# Patient Record
Sex: MALE | Race: WHITE | NOT HISPANIC OR LATINO | Employment: OTHER | URBAN - METROPOLITAN AREA
[De-identification: names, ages, dates, MRNs, and addresses within clinical notes are randomized per-mention and may not be internally consistent; named-entity substitution may affect disease eponyms.]

---

## 2023-02-08 PROBLEM — I10 BENIGN HYPERTENSION: Status: ACTIVE | Noted: 2023-02-08

## 2023-02-08 PROBLEM — E66.3 OVERWEIGHT WITH BODY MASS INDEX (BMI) OF 28 TO 28.9 IN ADULT: Status: ACTIVE | Noted: 2023-02-08

## 2023-02-08 PROBLEM — D75.1 POLYCYTHEMIA: Status: ACTIVE | Noted: 2023-02-08

## 2023-02-08 PROBLEM — F17.200 TOBACCO USE DISORDER: Status: ACTIVE | Noted: 2023-02-08

## 2023-02-08 PROBLEM — R39.9 UTI SYMPTOMS: Status: ACTIVE | Noted: 2023-02-08

## 2023-02-08 PROBLEM — E78.5 HYPERLIPIDEMIA: Status: ACTIVE | Noted: 2023-02-08

## 2023-02-08 PROBLEM — R35.0 URINE FREQUENCY: Status: ACTIVE | Noted: 2023-02-08

## 2023-02-08 PROBLEM — K63.5 POLYP OF COLON: Status: ACTIVE | Noted: 2023-02-08

## 2023-02-08 PROBLEM — F41.9 ANXIETY DISORDER: Status: ACTIVE | Noted: 2023-02-08

## 2023-02-08 PROBLEM — M70.60 TROCHANTERIC BURSITIS: Status: ACTIVE | Noted: 2023-02-08

## 2023-02-08 PROBLEM — G47.33 OSA (OBSTRUCTIVE SLEEP APNEA): Status: ACTIVE | Noted: 2023-02-08

## 2023-02-08 PROBLEM — N40.0 BPH (BENIGN PROSTATIC HYPERPLASIA): Status: ACTIVE | Noted: 2023-02-08

## 2023-02-08 PROBLEM — N53.14 RETROGRADE EJACULATION: Status: ACTIVE | Noted: 2023-02-08

## 2023-02-08 PROBLEM — M54.32 LEFT SIDED SCIATICA: Status: ACTIVE | Noted: 2023-02-08

## 2023-02-08 PROBLEM — K42.9 UMBILICAL HERNIA: Status: ACTIVE | Noted: 2023-02-08

## 2023-02-08 PROBLEM — H40.9 GLAUCOMA: Status: ACTIVE | Noted: 2023-02-08

## 2023-02-08 RX ORDER — VALSARTAN 80 MG/1
1 TABLET ORAL DAILY
COMMUNITY
End: 2023-03-21 | Stop reason: SDUPTHER

## 2023-02-08 RX ORDER — BRIMONIDINE TARTRATE 2 MG/ML
1 SOLUTION/ DROPS OPHTHALMIC EVERY 12 HOURS
COMMUNITY
Start: 2012-10-17

## 2023-02-08 RX ORDER — ATORVASTATIN CALCIUM 40 MG/1
40 TABLET, FILM COATED ORAL NIGHTLY
COMMUNITY
Start: 2018-08-22 | End: 2024-02-02

## 2023-02-08 RX ORDER — TAMSULOSIN HYDROCHLORIDE 0.4 MG/1
CAPSULE ORAL
COMMUNITY
Start: 2014-02-03 | End: 2023-04-17

## 2023-02-08 RX ORDER — FAMOTIDINE 40 MG/1
1 TABLET, FILM COATED ORAL 2 TIMES DAILY
COMMUNITY
Start: 2013-01-31 | End: 2024-02-02

## 2023-02-08 RX ORDER — TIMOLOL MALEATE 5 MG/ML
1 SOLUTION/ DROPS OPHTHALMIC EVERY 12 HOURS
COMMUNITY
Start: 2012-06-13 | End: 2023-11-21 | Stop reason: ALTCHOICE

## 2023-02-08 RX ORDER — CYCLOSPORINE 0.5 MG/ML
1 EMULSION OPHTHALMIC EVERY 12 HOURS
COMMUNITY
Start: 2013-04-04 | End: 2023-11-21 | Stop reason: ALTCHOICE

## 2023-02-08 RX ORDER — SERTRALINE HYDROCHLORIDE 100 MG/1
TABLET, FILM COATED ORAL
COMMUNITY
Start: 2013-01-31 | End: 2023-07-07

## 2023-03-15 ASSESSMENT — ENCOUNTER SYMPTOMS
SHORTNESS OF BREATH: 0
SWEATS: 0
BLURRED VISION: 0
PALPITATIONS: 0
PND: 0
NECK PAIN: 0
HYPERTENSION: 1
HEADACHES: 0
ORTHOPNEA: 0

## 2023-03-21 ENCOUNTER — OFFICE VISIT (OUTPATIENT)
Dept: PRIMARY CARE | Facility: CLINIC | Age: 68
End: 2023-03-21
Payer: COMMERCIAL

## 2023-03-21 VITALS
DIASTOLIC BLOOD PRESSURE: 88 MMHG | BODY MASS INDEX: 29.41 KG/M2 | WEIGHT: 185 LBS | TEMPERATURE: 97.1 F | SYSTOLIC BLOOD PRESSURE: 152 MMHG

## 2023-03-21 DIAGNOSIS — F17.200 TOBACCO USE DISORDER: ICD-10-CM

## 2023-03-21 DIAGNOSIS — N40.1 BENIGN PROSTATIC HYPERPLASIA WITH URINARY FREQUENCY: ICD-10-CM

## 2023-03-21 DIAGNOSIS — F41.9 ANXIETY DISORDER, UNSPECIFIED TYPE: ICD-10-CM

## 2023-03-21 DIAGNOSIS — I10 BENIGN HYPERTENSION: Primary | ICD-10-CM

## 2023-03-21 DIAGNOSIS — E78.5 HYPERLIPIDEMIA, UNSPECIFIED HYPERLIPIDEMIA TYPE: ICD-10-CM

## 2023-03-21 DIAGNOSIS — R35.0 BENIGN PROSTATIC HYPERPLASIA WITH URINARY FREQUENCY: ICD-10-CM

## 2023-03-21 DIAGNOSIS — G47.33 OSA (OBSTRUCTIVE SLEEP APNEA): ICD-10-CM

## 2023-03-21 PROBLEM — M70.60 TROCHANTERIC BURSITIS: Status: RESOLVED | Noted: 2023-02-08 | Resolved: 2023-03-21

## 2023-03-21 PROBLEM — R39.9 UTI SYMPTOMS: Status: RESOLVED | Noted: 2023-02-08 | Resolved: 2023-03-21

## 2023-03-21 PROCEDURE — 1160F RVW MEDS BY RX/DR IN RCRD: CPT | Performed by: INTERNAL MEDICINE

## 2023-03-21 PROCEDURE — 1159F MED LIST DOCD IN RCRD: CPT | Performed by: INTERNAL MEDICINE

## 2023-03-21 PROCEDURE — 99214 OFFICE O/P EST MOD 30 MIN: CPT | Performed by: INTERNAL MEDICINE

## 2023-03-21 PROCEDURE — 3077F SYST BP >= 140 MM HG: CPT | Performed by: INTERNAL MEDICINE

## 2023-03-21 PROCEDURE — 3079F DIAST BP 80-89 MM HG: CPT | Performed by: INTERNAL MEDICINE

## 2023-03-21 RX ORDER — VALSARTAN 160 MG/1
160 TABLET ORAL DAILY
Qty: 90 TABLET | Refills: 3 | Status: SHIPPED | OUTPATIENT
Start: 2023-03-21 | End: 2024-02-26

## 2023-03-21 ASSESSMENT — PATIENT HEALTH QUESTIONNAIRE - PHQ9
2. FEELING DOWN, DEPRESSED OR HOPELESS: NOT AT ALL
1. LITTLE INTEREST OR PLEASURE IN DOING THINGS: NOT AT ALL
SUM OF ALL RESPONSES TO PHQ9 QUESTIONS 1 AND 2: 0

## 2023-03-21 ASSESSMENT — ENCOUNTER SYMPTOMS
BLURRED VISION: 0
SHORTNESS OF BREATH: 0
NECK PAIN: 0
PALPITATIONS: 0
HYPERTENSION: 1
PND: 0
SWEATS: 0
HEADACHES: 0
ORTHOPNEA: 0

## 2023-03-21 NOTE — PROGRESS NOTES
"Subjective   Patient ID: Shawn Alfonso is a 67 y.o. male who presents for Follow-up (3 month).    Hypertension  This is a recurrent problem. The current episode started more than 1 month ago. The problem has been gradually improving since onset. The problem is controlled. Pertinent negatives include no anxiety, blurred vision, chest pain, headaches, malaise/fatigue, neck pain, orthopnea, palpitations, peripheral edema, PND, shortness of breath or sweats. There are no associated agents to hypertension. There are no known risk factors for coronary artery disease. There are no compliance problems.       #1 BPH- appt pending w/ .   Stable symptoms  #2 hyperlipidemia- on rx, no diff  #3 anxiety disorder - \"good\"   #4 nicotine dependence- rare tob use  #5 colon polyps- no change BMs  #6 htn- too high, increase valsartan to 160mg. f/u 3 mths  #7 polycythemia    Review of Systems   Constitutional:  Negative for malaise/fatigue.   Eyes:  Negative for blurred vision.   Respiratory:  Negative for shortness of breath.    Cardiovascular:  Negative for chest pain, palpitations, orthopnea and PND.   Musculoskeletal:  Negative for neck pain.   Neurological:  Negative for headaches.   All other systems reviewed and are negative.      Objective   /88   Temp 36.2 °C (97.1 °F)   Wt 83.9 kg (185 lb)   BMI 29.41 kg/m²     Physical Exam  Constitutional:       General: He is not in acute distress.     Appearance: Normal appearance. He is not ill-appearing or toxic-appearing.   Cardiovascular:      Rate and Rhythm: Normal rate and regular rhythm.      Heart sounds: No murmur heard.  Pulmonary:      Effort: Pulmonary effort is normal.      Breath sounds: Normal breath sounds.   Abdominal:      General: Abdomen is flat.      Palpations: Abdomen is soft. There is no mass.      Tenderness: There is no abdominal tenderness.   Neurological:      Mental Status: He is alert and oriented to person, place, and time.   Psychiatric:       "   Mood and Affect: Mood normal.         Thought Content: Thought content normal.         Judgment: Judgment normal.         Assessment/Plan       #1 BPH- remains an issue. f/u . Follow. PSA   #2 hyperlipidemia- check labs. Continue treatment.  #3 anxiety disorder -Stable. Continue treatment   #4 nicotine dependence- reviewed importance of smoking cessation again. Reviewed need to discontinue, reviewed strategies. Recommend CT chest, he declines.. . His smoking history is relatively mild, approximately 10 pack yearsâ€“smokes cigars. Spent 5 minutes discussing importance of discontinuing.  #5 colon polyps- follow up scope long overdue! reviewed w/ pt at length again. Again, stressed importance.  reviewed risk of delayed dx of colon CA.   #6 htn- too high, increase valsartan to 160mg. f/u 3 mths  #7 polycythemia- resolved. retest    #8 snoring, witnessed apneas, increased blood pressure. Sleep study ordered--> he declines.. Reviewed importance with patient.  Order entered for screening lab works.     Patient will consider tetanus booster, declines now. Declines shingles vaccine.  prevnar 20/pvax 23--> rec. reviewed.

## 2023-03-22 ENCOUNTER — LAB (OUTPATIENT)
Dept: LAB | Facility: LAB | Age: 68
End: 2023-03-22
Payer: COMMERCIAL

## 2023-03-22 DIAGNOSIS — N40.1 BENIGN PROSTATIC HYPERPLASIA WITH URINARY FREQUENCY: ICD-10-CM

## 2023-03-22 DIAGNOSIS — R35.0 BENIGN PROSTATIC HYPERPLASIA WITH URINARY FREQUENCY: ICD-10-CM

## 2023-03-22 DIAGNOSIS — E78.5 HYPERLIPIDEMIA, UNSPECIFIED HYPERLIPIDEMIA TYPE: ICD-10-CM

## 2023-03-22 DIAGNOSIS — I10 BENIGN HYPERTENSION: ICD-10-CM

## 2023-03-22 LAB
ALANINE AMINOTRANSFERASE (SGPT) (U/L) IN SER/PLAS: 22 U/L (ref 10–52)
ANION GAP IN SER/PLAS: 13 MMOL/L (ref 10–20)
CALCIUM (MG/DL) IN SER/PLAS: 9.4 MG/DL (ref 8.6–10.3)
CARBON DIOXIDE, TOTAL (MMOL/L) IN SER/PLAS: 28 MMOL/L (ref 21–32)
CHLORIDE (MMOL/L) IN SER/PLAS: 98 MMOL/L (ref 98–107)
CHOLESTEROL (MG/DL) IN SER/PLAS: 173 MG/DL (ref 0–199)
CHOLESTEROL IN HDL (MG/DL) IN SER/PLAS: 70.7 MG/DL
CHOLESTEROL/HDL RATIO: 2.4
CREATININE (MG/DL) IN SER/PLAS: 0.96 MG/DL (ref 0.5–1.3)
ERYTHROCYTE DISTRIBUTION WIDTH (RATIO) BY AUTOMATED COUNT: 13 % (ref 11.5–14.5)
ERYTHROCYTE MEAN CORPUSCULAR HEMOGLOBIN CONCENTRATION (G/DL) BY AUTOMATED: 33.1 G/DL (ref 32–36)
ERYTHROCYTE MEAN CORPUSCULAR VOLUME (FL) BY AUTOMATED COUNT: 90 FL (ref 80–100)
ERYTHROCYTES (10*6/UL) IN BLOOD BY AUTOMATED COUNT: 5.46 X10E12/L (ref 4.5–5.9)
GFR MALE: 87 ML/MIN/1.73M2
GLUCOSE (MG/DL) IN SER/PLAS: 98 MG/DL (ref 74–99)
HEMATOCRIT (%) IN BLOOD BY AUTOMATED COUNT: 49.3 % (ref 41–52)
HEMOGLOBIN (G/DL) IN BLOOD: 16.3 G/DL (ref 13.5–17.5)
LDL: 83 MG/DL (ref 0–99)
LEUKOCYTES (10*3/UL) IN BLOOD BY AUTOMATED COUNT: 5 X10E9/L (ref 4.4–11.3)
PLATELETS (10*3/UL) IN BLOOD AUTOMATED COUNT: 211 X10E9/L (ref 150–450)
POTASSIUM (MMOL/L) IN SER/PLAS: 4.6 MMOL/L (ref 3.5–5.3)
PROSTATE SPECIFIC AG (NG/ML) IN SER/PLAS: 2 NG/ML (ref 0–4)
SODIUM (MMOL/L) IN SER/PLAS: 134 MMOL/L (ref 136–145)
TRIGLYCERIDE (MG/DL) IN SER/PLAS: 95 MG/DL (ref 0–149)
UREA NITROGEN (MG/DL) IN SER/PLAS: 22 MG/DL (ref 6–23)
VLDL: 19 MG/DL (ref 0–40)

## 2023-03-22 PROCEDURE — 84153 ASSAY OF PSA TOTAL: CPT

## 2023-03-22 PROCEDURE — 36415 COLL VENOUS BLD VENIPUNCTURE: CPT

## 2023-03-22 PROCEDURE — 80061 LIPID PANEL: CPT

## 2023-03-22 PROCEDURE — 85027 COMPLETE CBC AUTOMATED: CPT

## 2023-03-22 PROCEDURE — 84460 ALANINE AMINO (ALT) (SGPT): CPT

## 2023-03-22 PROCEDURE — 80048 BASIC METABOLIC PNL TOTAL CA: CPT

## 2023-04-17 DIAGNOSIS — N40.1 BENIGN PROSTATIC HYPERPLASIA WITH LOWER URINARY TRACT SYMPTOMS, SYMPTOM DETAILS UNSPECIFIED: Primary | ICD-10-CM

## 2023-04-17 RX ORDER — TAMSULOSIN HYDROCHLORIDE 0.4 MG/1
CAPSULE ORAL
Qty: 90 CAPSULE | Refills: 3 | Status: SHIPPED | OUTPATIENT
Start: 2023-04-17 | End: 2023-11-21 | Stop reason: ALTCHOICE

## 2023-06-07 ASSESSMENT — ENCOUNTER SYMPTOMS
HEADACHES: 0
SWEATS: 0
ORTHOPNEA: 0
NECK PAIN: 0
PND: 0
PALPITATIONS: 0
BLURRED VISION: 0
HYPERTENSION: 1
SHORTNESS OF BREATH: 0

## 2023-06-12 ENCOUNTER — OFFICE VISIT (OUTPATIENT)
Dept: PRIMARY CARE | Facility: CLINIC | Age: 68
End: 2023-06-12
Payer: COMMERCIAL

## 2023-06-12 VITALS
TEMPERATURE: 97.3 F | DIASTOLIC BLOOD PRESSURE: 82 MMHG | SYSTOLIC BLOOD PRESSURE: 120 MMHG | WEIGHT: 186 LBS | HEIGHT: 68 IN | BODY MASS INDEX: 28.19 KG/M2

## 2023-06-12 DIAGNOSIS — E78.5 HYPERLIPIDEMIA, UNSPECIFIED HYPERLIPIDEMIA TYPE: Primary | ICD-10-CM

## 2023-06-12 DIAGNOSIS — N40.1 BENIGN PROSTATIC HYPERPLASIA WITH URINARY FREQUENCY: ICD-10-CM

## 2023-06-12 DIAGNOSIS — I10 BENIGN HYPERTENSION: ICD-10-CM

## 2023-06-12 DIAGNOSIS — R35.0 BENIGN PROSTATIC HYPERPLASIA WITH URINARY FREQUENCY: ICD-10-CM

## 2023-06-12 PROCEDURE — 1160F RVW MEDS BY RX/DR IN RCRD: CPT | Performed by: INTERNAL MEDICINE

## 2023-06-12 PROCEDURE — 3074F SYST BP LT 130 MM HG: CPT | Performed by: INTERNAL MEDICINE

## 2023-06-12 PROCEDURE — 99213 OFFICE O/P EST LOW 20 MIN: CPT | Performed by: INTERNAL MEDICINE

## 2023-06-12 PROCEDURE — 1159F MED LIST DOCD IN RCRD: CPT | Performed by: INTERNAL MEDICINE

## 2023-06-12 PROCEDURE — 3079F DIAST BP 80-89 MM HG: CPT | Performed by: INTERNAL MEDICINE

## 2023-06-12 ASSESSMENT — ENCOUNTER SYMPTOMS
HEADACHES: 0
PND: 0
ORTHOPNEA: 0
BLURRED VISION: 0
SWEATS: 0
SHORTNESS OF BREATH: 0
HYPERTENSION: 1
PALPITATIONS: 0
NECK PAIN: 0

## 2023-06-12 NOTE — PROGRESS NOTES
"Subjective   Patient ID: Shawn Alfonso is a 68 y.o. male who presents for Follow-up.    Hypertension  This is a recurrent problem. The current episode started more than 1 month ago. The problem has been gradually improving since onset. The problem is controlled. Pertinent negatives include no anxiety, blurred vision, chest pain, headaches, malaise/fatigue, neck pain, orthopnea, palpitations, peripheral edema, PND, shortness of breath or sweats. There are no associated agents to hypertension. There are no known risk factors for coronary artery disease. There are no compliance problems.       #1 BPH- appt pending w/ .   Stable symptoms but remains an issue, nocturia/freq  #2 hyperlipidemia- on rx, no diff  #3 anxiety disorder - \"good\"   #4 nicotine dependence- rare tob use  #5 colon polyps- no change BMs  #6 htn- at home 120s.  No HA, CP, dizziness.   #7 polycythemia    Review of Systems   Constitutional:  Negative for malaise/fatigue.   Eyes:  Negative for blurred vision.   Respiratory:  Negative for shortness of breath.    Cardiovascular:  Negative for chest pain, palpitations, orthopnea and PND.   Musculoskeletal:  Negative for neck pain.   Neurological:  Negative for headaches.   All other systems reviewed and are negative.      Objective   /82   Temp 36.3 °C (97.3 °F)   Ht 1.715 m (5' 7.5\")   Wt 84.4 kg (186 lb)   BMI 28.70 kg/m²     Physical Exam  Constitutional:       General: He is not in acute distress.     Appearance: Normal appearance. He is not ill-appearing or toxic-appearing.   Cardiovascular:      Rate and Rhythm: Normal rate and regular rhythm.      Heart sounds: No murmur heard.  Pulmonary:      Effort: Pulmonary effort is normal.      Breath sounds: Normal breath sounds.   Abdominal:      General: Abdomen is flat.      Palpations: Abdomen is soft. There is no mass.      Tenderness: There is no abdominal tenderness.   Neurological:      Mental Status: He is alert and oriented to person, " place, and time.   Psychiatric:         Mood and Affect: Mood normal.         Thought Content: Thought content normal.         Judgment: Judgment normal.       Lab Results   Component Value Date    WBC 5.0 03/22/2023    HGB 16.3 03/22/2023    HCT 49.3 03/22/2023     03/22/2023    CHOL 173 03/22/2023    TRIG 95 03/22/2023    HDL 70.7 03/22/2023    ALT 22 03/22/2023    AST 20 10/22/2018     (L) 03/22/2023    K 4.6 03/22/2023    CL 98 03/22/2023    CREATININE 0.96 03/22/2023    BUN 22 03/22/2023    CO2 28 03/22/2023    PSA 2.00 03/22/2023       Assessment/Plan       #1 BPH- remains an issue. f/u . Follow. PSA 9 mths  #2 hyperlipidemia- check labs. Continue treatment.  #3 anxiety disorder -Stable. Continue treatment   #4 nicotine dependence- reviewed importance of smoking cessation again. Reviewed need to discontinue, reviewed strategies. Recommend CT chest, he declines.  His smoking history is relatively mild, approximately 10 pack years, smokes cigars. Spent 5 minutes discussing importance of discontinuing.  #5 colon polyps- follow up scope long overdue! reviewed w/ pt at length again. Again, stressed importance.  reviewed risk of delayed dx of colon CA. He has information to sched  #6 htn- good, con't rx.  #7 polycythemia- resolved. retest    #8 snoring, witnessed apneas, increased blood pressure. Sleep study ordered--> he declines.. Reviewed importance with patient.  Order entered for screening lab works.     Patient will consider tetanus booster, declines now.   Declines shingles vaccine.  Reviewed.  prevnar 20--> rec. reviewed.

## 2023-07-07 DIAGNOSIS — F41.9 ANXIETY DISORDER, UNSPECIFIED TYPE: Primary | ICD-10-CM

## 2023-07-07 RX ORDER — SERTRALINE HYDROCHLORIDE 100 MG/1
TABLET, FILM COATED ORAL
Qty: 180 TABLET | Refills: 3 | Status: SHIPPED | OUTPATIENT
Start: 2023-07-07

## 2023-10-03 ENCOUNTER — APPOINTMENT (OUTPATIENT)
Dept: UROLOGY | Facility: CLINIC | Age: 68
End: 2023-10-03
Payer: COMMERCIAL

## 2023-10-16 ENCOUNTER — PROCEDURE VISIT (OUTPATIENT)
Dept: UROLOGY | Facility: CLINIC | Age: 68
End: 2023-10-16
Payer: COMMERCIAL

## 2023-10-16 VITALS
DIASTOLIC BLOOD PRESSURE: 92 MMHG | HEIGHT: 67 IN | SYSTOLIC BLOOD PRESSURE: 138 MMHG | TEMPERATURE: 97.3 F | BODY MASS INDEX: 29.03 KG/M2 | WEIGHT: 185 LBS

## 2023-10-16 DIAGNOSIS — N13.8 BPH WITH OBSTRUCTION/LOWER URINARY TRACT SYMPTOMS: Primary | ICD-10-CM

## 2023-10-16 DIAGNOSIS — N40.1 BPH WITH OBSTRUCTION/LOWER URINARY TRACT SYMPTOMS: Primary | ICD-10-CM

## 2023-10-16 DIAGNOSIS — Z01.818 PREOP TESTING: ICD-10-CM

## 2023-10-16 LAB
POC APPEARANCE, URINE: CLEAR
POC BILIRUBIN, URINE: NEGATIVE
POC BLOOD, URINE: NEGATIVE
POC COLOR, URINE: YELLOW
POC GLUCOSE, URINE: NEGATIVE MG/DL
POC KETONES, URINE: NEGATIVE MG/DL
POC LEUKOCYTES, URINE: NEGATIVE
POC NITRITE,URINE: NEGATIVE
POC PH, URINE: 5.5 PH
POC PROTEIN, URINE: NEGATIVE MG/DL
POC SPECIFIC GRAVITY, URINE: 1.01
POC UROBILINOGEN, URINE: 0.2 EU/DL

## 2023-10-16 PROCEDURE — 99215 OFFICE O/P EST HI 40 MIN: CPT | Performed by: UROLOGY

## 2023-10-16 PROCEDURE — 81002 URINALYSIS NONAUTO W/O SCOPE: CPT | Performed by: UROLOGY

## 2023-10-16 PROCEDURE — 52000 CYSTOURETHROSCOPY: CPT | Performed by: UROLOGY

## 2023-10-16 RX ORDER — SODIUM CHLORIDE, SODIUM LACTATE, POTASSIUM CHLORIDE, CALCIUM CHLORIDE 600; 310; 30; 20 MG/100ML; MG/100ML; MG/100ML; MG/100ML
100 INJECTION, SOLUTION INTRAVENOUS CONTINUOUS
Status: CANCELLED | OUTPATIENT
Start: 2023-10-16

## 2023-10-16 NOTE — PROGRESS NOTES
"Subjective     Shawn Alfonso is a 68 y.o. male presenting for cystoscopy in anticipation of an outlet procedure. Patient initially had increased urinary frequency. Patient reported he has had worsening LUTs for the last 4-6 months, he is mostly bothered by urinary frequency. He also has spontaneously leakage which is bothersome. He is currently taking 04.mg of Tamsulosin with no improvement in symptoms. Patient denies gross hematuria, dysuria, and flank pain. He has family history of prostate cancer (father).      PROCEDURE NOTE:    PREOPERATIVE DIAGNOSIS:  BPH     POSTOPERATIVE DIAGNOSIS:  Same    OPERATION:  Flexible Cystourethroscopy      ANESTHESIA:  2%  lidocaine jelly    COMPLICATIONS:  None    EBL: Minimal    DISPOSITION:  The patient was discharged home after the procedure, per routine.    INDICATIONS: :  Mr. Alfonso is a 68 y.o. patient with a history of BPH with LUTs who presents today for Cystoscopy.     The indications, risks and benefits of this procedure were discussed with the patient, consent was obtained prior to the procedure, and to the best of my judgement the patient seemed to understand and agree to the procedure.    PROCEDURE:  The patient  was brought into the procedure suite and informed consent was reviewed and confirmed. Vital signs were obtained prior to the procedure: BP (!) 138/92 (BP Location: Right arm, Patient Position: Sitting)   Temp 36.3 °C (97.3 °F) (Temporal)   Ht 1.702 m (5' 7\")   Wt 83.9 kg (185 lb)   BMI 28.98 kg/m² .  The patient was escorted onto the stretcher, placed supine, prepped with betadine and draped in the usual standard surgical fashion.  Intraurethral 2% viscous lidocaine jelly was used for local analgesia.  A 16 [place] flexible cystourethroscope was inserted into the urethra.       The penile urethra was normal.  Prostate: 73g on MRI   Bladder: Upon entering the bladder the entire bladder was surveyed in a 360 degree fashion.  The left and right ureteral " orifices were in normal orthotopic position effluxing clear yellow urine, bilaterally.   There was no evidence of any bladder lesions, foreign objects, stones or evidence of any mucosal changes. The cystoscope was then retroflexed.  The bladder neck was then further examined without any evidence of lesions.     The scope was then removed and in an antegrade fashion, the urethra and bladder were again resurveyed with no evidence of additional lesions.  The cystoscope was then fully removed.   The patient tolerated the procedure well.  Vitals were stable after the procedure.  The patient was able to void and was discharged home.  Verbal and written Post procedure instructions were reviewed with the patient.    IMPRESSION:  Trabeculated distended bladder, intravesical non obstructing median lobe, lateral hypertrophy, good sphincter coaptation, complete channel occlusion.     PLAN: Will plan for HoLEP    No past medical history on file.  Past Surgical History:   Procedure Laterality Date    CATARACT EXTRACTION  05/28/2013    Cataract Surgery    KNEE SURGERY  05/28/2013    Knee Surgery    OTHER SURGICAL HISTORY  05/28/2013    Eye Surgery Results Retina Reattached    UMBILICAL HERNIA REPAIR  02/20/2014    Umbilical Hernia Repair     Family History   Problem Relation Name Age of Onset    Other (Heart valve replacement) Mother      Prostate cancer Father       Current Outpatient Medications   Medication Sig Dispense Refill    atorvastatin (Lipitor) 40 mg tablet TAKE 1 TABLET DAILY AS DIRECTED (NEED FOLLOW UP APPOINTMENT)      brimonidine (AlphaGAN P) 0.2 % ophthalmic solution Administer 1 drop into both eyes every 12 hours.      cycloSPORINE (Restasis) 0.05 % ophthalmic emulsion Administer 1 drop into both eyes every 12 hours.      famotidine (Pepcid) 40 mg tablet 1 tablet (40 mg) 2 times a day.      sertraline (Zoloft) 100 mg tablet TAKE 2 TABLETS DAILY (WILL NEED AN APPOINTMENT FOR MORE REFILLS) 180 tablet 3    tamsulosin  (Flomax) 0.4 mg 24 hr capsule TAKE 1 CAPSULE DAILY (NEED APPOINTMENT FOR FURTHER REFILLS) 90 capsule 3    timolol (Timoptic) 0.5 % ophthalmic solution Administer 1 drop into both eyes every 12 hours.      valsartan (Diovan) 160 mg tablet Take 1 tablet (160 mg) by mouth once daily. For blood pressure 90 tablet 3     No current facility-administered medications for this visit.     No Known Allergies  Social History     Socioeconomic History    Marital status:      Spouse name: Not on file    Number of children: Not on file    Years of education: Not on file    Highest education level: Not on file   Occupational History    Not on file   Tobacco Use    Smoking status: Every Day     Types: Cigars    Smokeless tobacco: Never   Substance and Sexual Activity    Alcohol use: Not on file    Drug use: Not on file    Sexual activity: Not on file   Other Topics Concern    Not on file   Social History Narrative    Not on file     Social Determinants of Health     Financial Resource Strain: Not on file   Food Insecurity: Not on file   Transportation Needs: Not on file   Physical Activity: Not on file   Stress: Not on file   Social Connections: Not on file   Intimate Partner Violence: Not on file   Housing Stability: Not on file           Lab Review  Lab Results   Component Value Date    WBC 5.0 03/22/2023    RBC 5.46 03/22/2023    HGB 16.3 03/22/2023    HCT 49.3 03/22/2023     03/22/2023      Lab Results   Component Value Date    BUN 22 03/22/2023    CREATININE 0.96 03/22/2023      Lab Results   Component Value Date    PSA 2.00 03/22/2023         Assessment/Plan   Diagnoses and all orders for this visit:  BPH with obstruction/lower urinary tract symptoms  -     Cystourethroscopy; Future  -     POCT UA (nonautomated) manually resulted    BPH with LUTs     Cystoscopy was completed today with no complications. Prostate size from MRI is 73g.     Given the large volume of the prostate, I recommended proceeding with HoLEP.  I discussed that a laser will be used to shell out the obstructing tissue from the inside of the prostate gland. I discussed in detail the risks associated with the HoLEP procedure. As with any surgical procedure, HoLEP surgery has some risks. Such as, incontinence of urine which is common for a few months after surgery but is rarely permanent (about one percent of cases), bleeding after surgery, the need for transfusion or another operation due to bleeding, UTI, damage to the bladder, damage to the ureteral orifice (a small tube where kidney drains into the bladder), prolonged need for a catheter after surgery, or scar tissue in the area of surgery or urethra. Retrograde ejaculation was discussed as a permanent irreversible side effect.      All questions were answered to the patient's satisfaction. Patient agrees with the plan and wishes to proceed. Follow-up will be scheduled appropriately.       I spent 40 minutes of dedicated E&M time, including preparation and review of records, notes, and data, time spent with patient/family, and documentation.     Scribed for Dr. Martinez by Maria Teresa Bennett. I , Dr Martinez, have personally reviewed and agreed with the information entered by the Virtual Scribe.

## 2023-11-21 ENCOUNTER — PRE-ADMISSION TESTING (OUTPATIENT)
Dept: PREADMISSION TESTING | Facility: HOSPITAL | Age: 68
End: 2023-11-21
Payer: COMMERCIAL

## 2023-11-21 ENCOUNTER — LAB (OUTPATIENT)
Dept: LAB | Facility: LAB | Age: 68
End: 2023-11-21
Payer: COMMERCIAL

## 2023-11-21 VITALS
HEIGHT: 67 IN | SYSTOLIC BLOOD PRESSURE: 134 MMHG | TEMPERATURE: 96.3 F | HEART RATE: 64 BPM | BODY MASS INDEX: 28.51 KG/M2 | WEIGHT: 181.66 LBS | RESPIRATION RATE: 16 BRPM | DIASTOLIC BLOOD PRESSURE: 83 MMHG | OXYGEN SATURATION: 97 %

## 2023-11-21 DIAGNOSIS — Z01.818 PREOP TESTING: ICD-10-CM

## 2023-11-21 DIAGNOSIS — N40.1 BPH WITH OBSTRUCTION/LOWER URINARY TRACT SYMPTOMS: ICD-10-CM

## 2023-11-21 DIAGNOSIS — N13.8 BPH WITH OBSTRUCTION/LOWER URINARY TRACT SYMPTOMS: ICD-10-CM

## 2023-11-21 DIAGNOSIS — Z01.818 PREOPERATIVE TESTING: Primary | ICD-10-CM

## 2023-11-21 LAB
ANION GAP SERPL CALC-SCNC: 13 MMOL/L (ref 10–20)
BUN SERPL-MCNC: 16 MG/DL (ref 6–23)
CALCIUM SERPL-MCNC: 9.2 MG/DL (ref 8.6–10.6)
CHLORIDE SERPL-SCNC: 105 MMOL/L (ref 98–107)
CO2 SERPL-SCNC: 25 MMOL/L (ref 21–32)
CREAT SERPL-MCNC: 1.04 MG/DL (ref 0.5–1.3)
ERYTHROCYTE [DISTWIDTH] IN BLOOD BY AUTOMATED COUNT: 11.8 % (ref 11.5–14.5)
GFR SERPL CREATININE-BSD FRML MDRD: 79 ML/MIN/1.73M*2
GLUCOSE SERPL-MCNC: 88 MG/DL (ref 74–99)
HCT VFR BLD AUTO: 46.6 % (ref 41–52)
HGB BLD-MCNC: 15.7 G/DL (ref 13.5–17.5)
INR PPP: 0.9 (ref 0.9–1.2)
MCH RBC QN AUTO: 31.1 PG (ref 26–34)
MCHC RBC AUTO-ENTMCNC: 33.7 G/DL (ref 32–36)
MCV RBC AUTO: 92 FL (ref 80–100)
NRBC BLD-RTO: NORMAL /100{WBCS}
PLATELET # BLD AUTO: 195 X10*3/UL (ref 150–450)
POTASSIUM SERPL-SCNC: 4.5 MMOL/L (ref 3.5–5.3)
PROTHROMBIN TIME: 9 SECONDS (ref 9.3–12.7)
RBC # BLD AUTO: 5.05 X10*6/UL (ref 4.5–5.9)
SODIUM SERPL-SCNC: 138 MMOL/L (ref 136–145)
WBC # BLD AUTO: 5.2 X10*3/UL (ref 4.4–11.3)

## 2023-11-21 PROCEDURE — 99203 OFFICE O/P NEW LOW 30 MIN: CPT

## 2023-11-21 PROCEDURE — 85610 PROTHROMBIN TIME: CPT

## 2023-11-21 PROCEDURE — 36415 COLL VENOUS BLD VENIPUNCTURE: CPT

## 2023-11-21 PROCEDURE — 87086 URINE CULTURE/COLONY COUNT: CPT

## 2023-11-21 PROCEDURE — 85027 COMPLETE CBC AUTOMATED: CPT

## 2023-11-21 PROCEDURE — 93005 ELECTROCARDIOGRAM TRACING: CPT

## 2023-11-21 PROCEDURE — 80048 BASIC METABOLIC PNL TOTAL CA: CPT

## 2023-11-21 RX ORDER — DORZOLAMIDE HYDROCHLORIDE AND TIMOLOL MALEATE 20; 5 MG/ML; MG/ML
1 SOLUTION/ DROPS OPHTHALMIC 2 TIMES DAILY
COMMUNITY

## 2023-11-21 RX ORDER — LATANOPROST 50 UG/ML
1 SOLUTION/ DROPS OPHTHALMIC NIGHTLY
COMMUNITY

## 2023-11-21 RX ORDER — MINERAL OIL
180 ENEMA (ML) RECTAL DAILY
COMMUNITY

## 2023-11-21 RX ORDER — ACETAMINOPHEN 500 MG
1000 TABLET ORAL EVERY 6 HOURS PRN
COMMUNITY

## 2023-11-21 ASSESSMENT — DUKE ACTIVITY SCORE INDEX (DASI)
CAN YOU PARTICIPATE IN MODERATE RECREATIONAL ACTIVITIES LIKE GOLF, BOWLING, DANCING, DOUBLES TENNIS OR THROWING A BASEBALL OR FOOTBALL: YES
CAN YOU CLIMB A FLIGHT OF STAIRS OR WALK UP A HILL: YES
CAN YOU DO MODERATE WORK AROUND THE HOUSE LIKE VACUUMING, SWEEPING FLOORS OR CARRYING GROCERIES: YES
DASI METS SCORE: 7
CAN YOU HAVE SEXUAL RELATIONS: YES
CAN YOU TAKE CARE OF YOURSELF (EAT, DRESS, BATHE, OR USE TOILET): YES
CAN YOU WALK A BLOCK OR TWO ON LEVEL GROUND: YES
TOTAL_SCORE: 34.7
CAN YOU DO HEAVY WORK AROUND THE HOUSE LIKE SCRUBBING FLOORS OR LIFTING AND MOVING HEAVY FURNITURE: NO
CAN YOU PARTICIPATE IN STRENOUS SPORTS LIKE SWIMMING, SINGLES TENNIS, FOOTBALL, BASKETBALL, OR SKIING: NO
CAN YOU RUN A SHORT DISTANCE: NO
CAN YOU DO YARD WORK LIKE RAKING LEAVES, WEEDING OR PUSHING A MOWER: YES
CAN YOU DO LIGHT WORK AROUND THE HOUSE LIKE DUSTING OR WASHING DISHES: YES
CAN YOU WALK INDOORS, SUCH AS AROUND YOUR HOUSE: YES

## 2023-11-21 ASSESSMENT — CHADS2 SCORE
CHADS2 SCORE: 1
HYPERTENSION: YES
CHF: NO
PRIOR STROKE OR TIA OR THROMBOEMBOLISM: NO
AGE GREATER THAN OR EQUAL TO 75: NO
DIABETES: NO

## 2023-11-21 ASSESSMENT — PAIN SCALES - GENERAL: PAINLEVEL_OUTOF10: 0 - NO PAIN

## 2023-11-21 ASSESSMENT — PAIN - FUNCTIONAL ASSESSMENT: PAIN_FUNCTIONAL_ASSESSMENT: 0-10

## 2023-11-21 NOTE — PREPROCEDURE INSTRUCTIONS
Medication List            Accurate as of November 21, 2023 10:10 AM. Always use your most recent med list.                acetaminophen 500 mg tablet  Commonly known as: Tylenol  Medication Adjustments for Surgery: Take morning of surgery with sip of water, no other fluids     atorvastatin 40 mg tablet  Commonly known as: Lipitor  Medication Adjustments for Surgery: Continue until night before surgery     brimonidine 0.2 % ophthalmic solution  Commonly known as: AlphaGAN  Medication Adjustments for Surgery: Take morning of surgery with sip of water, no other fluids     dorzolamide-timoloL 22.3-6.8 mg/mL ophthalmic solution  Commonly known as: Cosopt  Medication Adjustments for Surgery: Take morning of surgery with sip of water, no other fluids     famotidine 40 mg tablet  Commonly known as: Pepcid  Medication Adjustments for Surgery: Take morning of surgery with sip of water, no other fluids     fexofenadine 180 mg tablet  Commonly known as: Allegra  Medication Adjustments for Surgery: Take morning of surgery with sip of water, no other fluids     latanoprost 0.005 % ophthalmic solution  Commonly known as: Xalatan  Medication Adjustments for Surgery: Take morning of surgery with sip of water, no other fluids     sertraline 100 mg tablet  Commonly known as: Zoloft  TAKE 2 TABLETS DAILY (WILL NEED AN APPOINTMENT FOR MORE REFILLS)  Medication Adjustments for Surgery: Take morning of surgery with sip of water, no other fluids     valsartan 160 mg tablet  Commonly known as: Diovan  Take 1 tablet (160 mg) by mouth once daily. For blood pressure  Medication Adjustments for Surgery: Stop 1 day before surgery                              NPO Instructions:    Do not eat any food after midnight the night before your surgery/procedure.    Additional Instructions:     Seven/Six Days before Surgery:  Review your medication instructions, stop indicated medications  Five Days before Surgery:  Review your medication  instructions, stop indicated medications  Three Days before Surgery:  Review your medication instructions, stop indicated medications  The Day before Surgery:  Review your medication instructions, stop indicated medications  You will be contacted regarding the time of your arrival to facility and surgery time  Do not eat any food after Midnight  Day of Surgery:  Review your medication instructions, take indicated medications  Wear  comfortable loose fitting clothing  Do not use moisturizers, creams, lotions or perfume  All jewelry and valuables should be left at home

## 2023-11-21 NOTE — CPM/PAT H&P
CPM/PAT Evaluation       Name: Shawn Alfonso (Shawn Alfonso)  /Age: 1955/68 y.o.     In Person visit      Chief Complaint: BPH with LUTS    HPI  Patient is a 69 y/o alert and oriented male coming in for PAT for a holmium laser enucleation of the prostate scheduled on 2023 with Dr Martinez. He reports urinary frequency, urgency, and nocturia without dysuria, hematuria, fevers, chills, or myalgias. Patient denies Cx pain, SOB, STODDARD, and NVDC. Patient also denies Hx DVT/PE. Current medications were reviewed and a presurgical medication schedule was provided. He has no questions at this time.    NO PERSONAL REPORTS OF REACTIONS TO ANESTHESIA  NO PERSONAL REPORTS OF FAMILY HISTORY OF REACTIONS TO ANESTHESIA  NO PERSONAL REPORTS OF METAL, NICKEL, OR SHELLFISH ALLERGY  CURRENT DAILT CIGAR SMOKER-1 CIGAR A DAY X 10 YEARS  +ETOH 2 DRINKS A WEEK/NO DRUGS    Past Medical History:   Diagnosis Date    Anxiety     BPH (benign prostatic hyperplasia)     Depression     GERD (gastroesophageal reflux disease)     Glaucoma     Hyperlipidemia     Hypertension      Past Surgical History:   Procedure Laterality Date    CATARACT EXTRACTION Bilateral 2013    Cataract Surgery    KNEE SURGERY Bilateral 2013    B/L knee scopes    OTHER SURGICAL HISTORY Left 2013    Eye Surgery Results Retina Reattached    UMBILICAL HERNIA REPAIR  2014    Umbilical Hernia Repair     Family History   Problem Relation Name Age of Onset    Other (Heart valve replacement) Mother      Cancer Father      Prostate cancer Father       No Known Allergies    Medication Documentation Review Audit       Reviewed by Susana Davidson RN (Registered Nurse) on 23 at 1004      Medication Order Taking? Sig Documenting Provider Last Dose Status   acetaminophen (Tylenol) 500 mg tablet [id number] No Take 2 tablets (1,000 mg) by mouth every 6 hours if needed for mild pain (1 - 3). Historical Provider, MD More than a month Active    atorvastatin (Lipitor) 40 mg tablet [id number] Yes Take 1 tablet (40 mg) by mouth once daily at bedtime. Doug Provider, MD 11/20/2023 Active   brimonidine (AlphaGAN P) 0.2 % ophthalmic solution [id number] Yes Administer 1 drop into both eyes every 12 hours. Doug Valdez MD 11/21/2023 Active     Discontinued 11/21/23 0959   dorzolamide-timoloL (Cosopt) 22.3-6.8 mg/mL ophthalmic solution [id number] Yes Administer 1 drop into both eyes 2 times a day. Doug Valdez MD 11/21/2023 tylenol Active   famotidine (Pepcid) 40 mg tablet [id number] Yes 1 tablet (40 mg) 2 times a day. Doug Valdez MD 11/21/2023 Active   fexofenadine (Allegra) 180 mg tablet [id number] Yes Take 1 tablet (180 mg) by mouth once daily. Doug Valdez MD 11/21/2023 Active   latanoprost (Xalatan) 0.005 % ophthalmic solution [id number] Yes Administer 1 drop into both eyes once daily at bedtime. Doug Provider, MD 11/20/2023 Active   sertraline (Zoloft) 100 mg tablet [id number] Yes TAKE 2 TABLETS DAILY (WILL NEED AN APPOINTMENT FOR MORE REFILLS)   Patient taking differently: Take 2 tablets (200 mg) by mouth once daily at bedtime.    Brittany Jacob MD 11/20/2023 Active     Discontinued 11/21/23 0959     Discontinued 11/21/23 1000   valsartan (Diovan) 160 mg tablet [id number] Yes Take 1 tablet (160 mg) by mouth once daily. For blood pressure Brittany Jacob MD 11/21/2023 Active                  Review of Systems   Constitutional: Negative for chills, decreased appetite, diaphoresis, fever and malaise/fatigue.   Eyes:  Negative for blurred vision and double vision.   Cardiovascular:  Negative for chest pain, claudication, cyanosis, dyspnea on exertion, irregular heartbeat, leg swelling, near-syncope and palpitations.   Respiratory:  Negative for cough, hemoptysis, shortness of breath and wheezing.    Endocrine: Negative for cold intolerance, heat intolerance, polydipsia, polyphagia and polyuria.   Gastrointestinal:  Negative for  "abdominal pain, constipation, diarrhea, dysphagia, nausea and vomiting.   Genitourinary:  Positive for frequency, urgency, and nocturia. Negative for bladder incontinence, dysuria, hematuria, incomplete emptying, pelvic pain.   Neurological:  Negative for headaches, light-headedness, paresthesias, sensory change and weakness.   Psychiatric/Behavioral:  Negative for altered mental status.       Vitals and nursing note reviewed. Physical exam within normal limits.   Constitutional:       Appearance: Normal appearance. He is normal weight.   HENT:      Head: Normocephalic and atraumatic.      Mouth/Throat:      Mouth: Mucous membranes are moist.      Pharynx: Oropharynx is clear.   Eyes:      Extraocular Movements: Extraocular movements intact.      Conjunctiva/sclera: Conjunctivae normal.      Pupils: Pupils are equal, round, and reactive to light.   Cardiovascular:      Rate and Rhythm: Normal rate and regular rhythm.      Pulses: Normal pulses.      Heart sounds: Normal heart sounds.   Pulmonary:      Effort: Pulmonary effort is normal.      Breath sounds: Normal breath sounds.   Abdominal:      General: Abdomen is flat. Bowel sounds are normal.      Palpations: Abdomen is soft.   Musculoskeletal:      Cervical back: Normal range of motion and neck supple.   Skin:     General: Skin is warm and dry.      Capillary Refill: Capillary refill takes less than 2 seconds.   Neurological:      General: No focal deficit present.      Mental Status: He is alert and oriented to person, place, and time. Mental status is at baseline.   Psychiatric:         Mood and Affect: Mood normal.         Behavior: Behavior normal.         Thought Content: Thought content normal.         Judgment: Judgment normal.     PAT AIRWAY:   Airway:     Mallampati::  III    TM distance::  >3 FB    Neck ROM::  Full     Visit Vitals  /83   Pulse 64   Temp 35.7 °C (96.3 °F) (Temporal)   Resp 16   Ht 1.702 m (5' 7\")   Wt 82.4 kg (181 lb 10.5 oz) "   SpO2 97%   BMI 28.45 kg/m²   Smoking Status Every Day   BSA 1.97 m²      EKG COMPLETED IN [facility] 11/21/2023. NSR. RATE 62. NO ACUTE CHANGES.   ASYMPTOMATIC WITH GOOD FUNCTIONAL STATUS    DASI Risk Score      Flowsheet Row Most Recent Value   DASI SCORE 34.7   METS Score (Will be calculated only when all the questions are answered) 7          Caprini DVT Assessment      Flowsheet Row Most Recent Value   DVT Score 5   Current Status Major surgery planned, including arthroscopic and laproscopic (1-2 hours)   Age 60-75 years   BMI 30 or less          Modified Frailty Index      Flowsheet Row Most Recent Value   Modified Frailty Index Calculator .0909          CHADS2 Stroke Risk  Current as of 12 minutes ago        N/A 3 - 100%: High Risk   2 - 3%: Medium Risk   0 - 2%: Low Risk     Last Change: N/A          This score determines the patient's risk of having a stroke if the patient has atrial fibrillation.        This score is not applicable to this patient. Components are not calculated.          Revised Cardiac Risk Index      Flowsheet Row Most Recent Value   Revised Cardiac Risk Calculator 0          Apfel Simplified Score    No data to display       Risk Analysis Index Results This Encounter    No data found in the last 1 encounters.       Stop Bang Score      Flowsheet Row Most Recent Value   Do you snore loudly? 1   Do you often feel tired or fatigued after your sleep? 0   Has anyone ever observed you stop breathing in your sleep? 0   Do you have or are you being treated for high blood pressure? 1   Recent BMI (Calculated) 28.5   Is BMI greater than 35 kg/m2? 0=No   Age older than 50 years old? 1=Yes   Is your neck circumference greater than 17 inches (Male) or 16 inches (Female)? 0   Gender - Male 1=Yes   STOP-BANG Total Score 4          Assessment and Plan:     BPH with LUTS-holmium laser enucleation of the prostate scheduled on 11/30/2023 with Dr Martinez.    Hypertension-Managed with valsartan (Diovan) 160 mg  tablet. BP in /83    GERD-Managed with famotidine (Pepcid) 40 mg tablet    Preoperative risk assessment  ASA II  RCRI-0 POINTS CLASS I RISK 3.9%  STOP-BANGS-4 POINTS MODERATE RISK FOR SABINE  NSQIP-PREDICTED LENGTH OF STAY 0.5 DAYS  ARISCAT-3 POINTS LOW RISK 1.6%  DASI-34.7 POINTS. 7 METS  TEJA-0.1%  JHFRAT-6 POINTS MODERATE RISK FOR FALLS  CLEARANCE-NA  PAT TESTING-CBC, BMP, PT/INR, UCX ORDERED BY DR UBRT. EKG COMPLETED IN PAT    *CLEARED FOR SURGERY PENDING LABS/EKG     *FACE TO FACE TIME 20 MINUTES.

## 2023-11-21 NOTE — H&P (VIEW-ONLY)
CPM/PAT Evaluation       Name: Shawn Alfonso (Shawn Alfonso)  /Age: 1955/68 y.o.     In Person visit      Chief Complaint: BPH with LUTS    HPI  Patient is a 67 y/o alert and oriented male coming in for PAT for a holmium laser enucleation of the prostate scheduled on 2023 with Dr Martinez. He reports urinary frequency, urgency, and nocturia without dysuria, hematuria, fevers, chills, or myalgias. Patient denies Cx pain, SOB, STODDARD, and NVDC. Patient also denies Hx DVT/PE. Current medications were reviewed and a presurgical medication schedule was provided. He has no questions at this time.    NO PERSONAL REPORTS OF REACTIONS TO ANESTHESIA  NO PERSONAL REPORTS OF FAMILY HISTORY OF REACTIONS TO ANESTHESIA  NO PERSONAL REPORTS OF METAL, NICKEL, OR SHELLFISH ALLERGY  CURRENT DAILT CIGAR SMOKER-1 CIGAR A DAY X 10 YEARS  +ETOH 2 DRINKS A WEEK/NO DRUGS    Past Medical History:   Diagnosis Date    Anxiety     BPH (benign prostatic hyperplasia)     Depression     GERD (gastroesophageal reflux disease)     Glaucoma     Hyperlipidemia     Hypertension      Past Surgical History:   Procedure Laterality Date    CATARACT EXTRACTION Bilateral 2013    Cataract Surgery    KNEE SURGERY Bilateral 2013    B/L knee scopes    OTHER SURGICAL HISTORY Left 2013    Eye Surgery Results Retina Reattached    UMBILICAL HERNIA REPAIR  2014    Umbilical Hernia Repair     Family History   Problem Relation Name Age of Onset    Other (Heart valve replacement) Mother      Cancer Father      Prostate cancer Father       No Known Allergies    Medication Documentation Review Audit       Reviewed by Susana Davidson RN (Registered Nurse) on 23 at 1004      Medication Order Taking? Sig Documenting Provider Last Dose Status   acetaminophen (Tylenol) 500 mg tablet [id number] No Take 2 tablets (1,000 mg) by mouth every 6 hours if needed for mild pain (1 - 3). Historical Provider, MD More than a month Active    atorvastatin (Lipitor) 40 mg tablet [id number] Yes Take 1 tablet (40 mg) by mouth once daily at bedtime. Doug Provider, MD 11/20/2023 Active   brimonidine (AlphaGAN P) 0.2 % ophthalmic solution [id number] Yes Administer 1 drop into both eyes every 12 hours. Doug Valdez MD 11/21/2023 Active     Discontinued 11/21/23 0959   dorzolamide-timoloL (Cosopt) 22.3-6.8 mg/mL ophthalmic solution [id number] Yes Administer 1 drop into both eyes 2 times a day. Doug Valdez MD 11/21/2023 tylenol Active   famotidine (Pepcid) 40 mg tablet [id number] Yes 1 tablet (40 mg) 2 times a day. Doug Valdez MD 11/21/2023 Active   fexofenadine (Allegra) 180 mg tablet [id number] Yes Take 1 tablet (180 mg) by mouth once daily. Doug Valdez MD 11/21/2023 Active   latanoprost (Xalatan) 0.005 % ophthalmic solution [id number] Yes Administer 1 drop into both eyes once daily at bedtime. Doug Provider, MD 11/20/2023 Active   sertraline (Zoloft) 100 mg tablet [id number] Yes TAKE 2 TABLETS DAILY (WILL NEED AN APPOINTMENT FOR MORE REFILLS)   Patient taking differently: Take 2 tablets (200 mg) by mouth once daily at bedtime.    Brittany Jacob MD 11/20/2023 Active     Discontinued 11/21/23 0959     Discontinued 11/21/23 1000   valsartan (Diovan) 160 mg tablet [id number] Yes Take 1 tablet (160 mg) by mouth once daily. For blood pressure Brittany Jcaob MD 11/21/2023 Active                  Review of Systems   Constitutional: Negative for chills, decreased appetite, diaphoresis, fever and malaise/fatigue.   Eyes:  Negative for blurred vision and double vision.   Cardiovascular:  Negative for chest pain, claudication, cyanosis, dyspnea on exertion, irregular heartbeat, leg swelling, near-syncope and palpitations.   Respiratory:  Negative for cough, hemoptysis, shortness of breath and wheezing.    Endocrine: Negative for cold intolerance, heat intolerance, polydipsia, polyphagia and polyuria.   Gastrointestinal:  Negative for  "abdominal pain, constipation, diarrhea, dysphagia, nausea and vomiting.   Genitourinary:  Positive for frequency, urgency, and nocturia. Negative for bladder incontinence, dysuria, hematuria, incomplete emptying, pelvic pain.   Neurological:  Negative for headaches, light-headedness, paresthesias, sensory change and weakness.   Psychiatric/Behavioral:  Negative for altered mental status.       Vitals and nursing note reviewed. Physical exam within normal limits.   Constitutional:       Appearance: Normal appearance. He is normal weight.   HENT:      Head: Normocephalic and atraumatic.      Mouth/Throat:      Mouth: Mucous membranes are moist.      Pharynx: Oropharynx is clear.   Eyes:      Extraocular Movements: Extraocular movements intact.      Conjunctiva/sclera: Conjunctivae normal.      Pupils: Pupils are equal, round, and reactive to light.   Cardiovascular:      Rate and Rhythm: Normal rate and regular rhythm.      Pulses: Normal pulses.      Heart sounds: Normal heart sounds.   Pulmonary:      Effort: Pulmonary effort is normal.      Breath sounds: Normal breath sounds.   Abdominal:      General: Abdomen is flat. Bowel sounds are normal.      Palpations: Abdomen is soft.   Musculoskeletal:      Cervical back: Normal range of motion and neck supple.   Skin:     General: Skin is warm and dry.      Capillary Refill: Capillary refill takes less than 2 seconds.   Neurological:      General: No focal deficit present.      Mental Status: He is alert and oriented to person, place, and time. Mental status is at baseline.   Psychiatric:         Mood and Affect: Mood normal.         Behavior: Behavior normal.         Thought Content: Thought content normal.         Judgment: Judgment normal.     PAT AIRWAY:   Airway:     Mallampati::  III    TM distance::  >3 FB    Neck ROM::  Full     Visit Vitals  /83   Pulse 64   Temp 35.7 °C (96.3 °F) (Temporal)   Resp 16   Ht 1.702 m (5' 7\")   Wt 82.4 kg (181 lb 10.5 oz) "   SpO2 97%   BMI 28.45 kg/m²   Smoking Status Every Day   BSA 1.97 m²      EKG COMPLETED IN [facility] 11/21/2023. NSR. RATE 62. NO ACUTE CHANGES.   ASYMPTOMATIC WITH GOOD FUNCTIONAL STATUS    DASI Risk Score      Flowsheet Row Most Recent Value   DASI SCORE 34.7   METS Score (Will be calculated only when all the questions are answered) 7          Caprini DVT Assessment      Flowsheet Row Most Recent Value   DVT Score 5   Current Status Major surgery planned, including arthroscopic and laproscopic (1-2 hours)   Age 60-75 years   BMI 30 or less          Modified Frailty Index      Flowsheet Row Most Recent Value   Modified Frailty Index Calculator .0909          CHADS2 Stroke Risk  Current as of 12 minutes ago        N/A 3 - 100%: High Risk   2 - 3%: Medium Risk   0 - 2%: Low Risk     Last Change: N/A          This score determines the patient's risk of having a stroke if the patient has atrial fibrillation.        This score is not applicable to this patient. Components are not calculated.          Revised Cardiac Risk Index      Flowsheet Row Most Recent Value   Revised Cardiac Risk Calculator 0          Apfel Simplified Score    No data to display       Risk Analysis Index Results This Encounter    No data found in the last 1 encounters.       Stop Bang Score      Flowsheet Row Most Recent Value   Do you snore loudly? 1   Do you often feel tired or fatigued after your sleep? 0   Has anyone ever observed you stop breathing in your sleep? 0   Do you have or are you being treated for high blood pressure? 1   Recent BMI (Calculated) 28.5   Is BMI greater than 35 kg/m2? 0=No   Age older than 50 years old? 1=Yes   Is your neck circumference greater than 17 inches (Male) or 16 inches (Female)? 0   Gender - Male 1=Yes   STOP-BANG Total Score 4          Assessment and Plan:     BPH with LUTS-holmium laser enucleation of the prostate scheduled on 11/30/2023 with Dr Martinez.    Hypertension-Managed with valsartan (Diovan) 160 mg  tablet. BP in /83    GERD-Managed with famotidine (Pepcid) 40 mg tablet    Preoperative risk assessment  ASA II  RCRI-0 POINTS CLASS I RISK 3.9%  STOP-BANGS-4 POINTS MODERATE RISK FOR SABINE  NSQIP-PREDICTED LENGTH OF STAY 0.5 DAYS  ARISCAT-3 POINTS LOW RISK 1.6%  DASI-34.7 POINTS. 7 METS  TEJA-0.1%  JHFRAT-6 POINTS MODERATE RISK FOR FALLS  CLEARANCE-NA  PAT TESTING-CBC, BMP, PT/INR, UCX ORDERED BY DR BURT. EKG COMPLETED IN PAT    *CLEARED FOR SURGERY PENDING LABS/EKG     *FACE TO FACE TIME 20 MINUTES.

## 2023-11-22 LAB — BACTERIA UR CULT: NO GROWTH

## 2023-11-26 LAB
ATRIAL RATE: 62 BPM
P AXIS: 57 DEGREES
P OFFSET: 181 MS
P ONSET: 126 MS
PR INTERVAL: 186 MS
Q ONSET: 219 MS
QRS COUNT: 11 BEATS
QRS DURATION: 88 MS
QT INTERVAL: 402 MS
QTC CALCULATION(BAZETT): 408 MS
QTC FREDERICIA: 406 MS
R AXIS: -18 DEGREES
T AXIS: 37 DEGREES
T OFFSET: 420 MS
VENTRICULAR RATE: 62 BPM

## 2023-11-29 ENCOUNTER — ANESTHESIA EVENT (OUTPATIENT)
Dept: OPERATING ROOM | Facility: HOSPITAL | Age: 68
End: 2023-11-29
Payer: COMMERCIAL

## 2023-11-30 ENCOUNTER — ANESTHESIA (OUTPATIENT)
Dept: OPERATING ROOM | Facility: HOSPITAL | Age: 68
End: 2023-11-30
Payer: COMMERCIAL

## 2023-11-30 ENCOUNTER — HOSPITAL ENCOUNTER (OUTPATIENT)
Facility: HOSPITAL | Age: 68
Setting detail: OUTPATIENT SURGERY
Discharge: HOME | End: 2023-11-30
Attending: UROLOGY | Admitting: UROLOGY
Payer: COMMERCIAL

## 2023-11-30 VITALS
SYSTOLIC BLOOD PRESSURE: 143 MMHG | TEMPERATURE: 98.1 F | RESPIRATION RATE: 16 BRPM | HEIGHT: 67 IN | OXYGEN SATURATION: 95 % | DIASTOLIC BLOOD PRESSURE: 76 MMHG | BODY MASS INDEX: 27.92 KG/M2 | HEART RATE: 59 BPM | WEIGHT: 177.91 LBS

## 2023-11-30 DIAGNOSIS — N13.8 BPH WITH OBSTRUCTION/LOWER URINARY TRACT SYMPTOMS: ICD-10-CM

## 2023-11-30 DIAGNOSIS — N40.1 BPH WITH OBSTRUCTION/LOWER URINARY TRACT SYMPTOMS: ICD-10-CM

## 2023-11-30 PROCEDURE — C1769 GUIDE WIRE: HCPCS | Performed by: UROLOGY

## 2023-11-30 PROCEDURE — 7100000009 HC PHASE TWO TIME - INITIAL BASE CHARGE: Performed by: UROLOGY

## 2023-11-30 PROCEDURE — 3700000002 HC GENERAL ANESTHESIA TIME - EACH INCREMENTAL 1 MINUTE: Performed by: UROLOGY

## 2023-11-30 PROCEDURE — 0754T DGTZ GLS MCRSCP SLD LEVEL V: CPT | Mod: TC,SUR,BEALAB | Performed by: UROLOGY

## 2023-11-30 PROCEDURE — 52649 PROSTATE LASER ENUCLEATION: CPT | Performed by: UROLOGY

## 2023-11-30 PROCEDURE — 2720000007 HC OR 272 NO HCPCS: Performed by: UROLOGY

## 2023-11-30 PROCEDURE — 2500000001 HC RX 250 WO HCPCS SELF ADMINISTERED DRUGS (ALT 637 FOR MEDICARE OP): Performed by: UROLOGY

## 2023-11-30 PROCEDURE — 7100000002 HC RECOVERY ROOM TIME - EACH INCREMENTAL 1 MINUTE: Performed by: UROLOGY

## 2023-11-30 PROCEDURE — C1782 MORCELLATOR: HCPCS | Performed by: UROLOGY

## 2023-11-30 PROCEDURE — C1758 CATHETER, URETERAL: HCPCS | Performed by: UROLOGY

## 2023-11-30 PROCEDURE — 88307 TISSUE EXAM BY PATHOLOGIST: CPT | Performed by: PATHOLOGY

## 2023-11-30 PROCEDURE — 2500000004 HC RX 250 GENERAL PHARMACY W/ HCPCS (ALT 636 FOR OP/ED): Performed by: UROLOGY

## 2023-11-30 PROCEDURE — A52601 PR TRANSURETHRAL ELEC-SURG PROSTATECTOM: Performed by: ANESTHESIOLOGY

## 2023-11-30 PROCEDURE — A52601 PR TRANSURETHRAL ELEC-SURG PROSTATECTOM: Performed by: NURSE ANESTHETIST, CERTIFIED REGISTERED

## 2023-11-30 PROCEDURE — 7100000010 HC PHASE TWO TIME - EACH INCREMENTAL 1 MINUTE: Performed by: UROLOGY

## 2023-11-30 PROCEDURE — 2500000005 HC RX 250 GENERAL PHARMACY W/O HCPCS: Performed by: NURSE ANESTHETIST, CERTIFIED REGISTERED

## 2023-11-30 PROCEDURE — 7100000001 HC RECOVERY ROOM TIME - INITIAL BASE CHARGE: Performed by: UROLOGY

## 2023-11-30 PROCEDURE — 3600000003 HC OR TIME - INITIAL BASE CHARGE - PROCEDURE LEVEL THREE: Performed by: UROLOGY

## 2023-11-30 PROCEDURE — 2500000004 HC RX 250 GENERAL PHARMACY W/ HCPCS (ALT 636 FOR OP/ED): Performed by: NURSE ANESTHETIST, CERTIFIED REGISTERED

## 2023-11-30 PROCEDURE — 3600000008 HC OR TIME - EACH INCREMENTAL 1 MINUTE - PROCEDURE LEVEL THREE: Performed by: UROLOGY

## 2023-11-30 PROCEDURE — 3700000001 HC GENERAL ANESTHESIA TIME - INITIAL BASE CHARGE: Performed by: UROLOGY

## 2023-11-30 RX ORDER — CEFAZOLIN SODIUM 2 G/100ML
2 INJECTION, SOLUTION INTRAVENOUS ONCE
Status: COMPLETED | OUTPATIENT
Start: 2023-11-30 | End: 2023-11-30

## 2023-11-30 RX ORDER — LABETALOL HYDROCHLORIDE 5 MG/ML
5 INJECTION, SOLUTION INTRAVENOUS ONCE AS NEEDED
Status: DISCONTINUED | OUTPATIENT
Start: 2023-11-30 | End: 2023-11-30 | Stop reason: HOSPADM

## 2023-11-30 RX ORDER — HYDRALAZINE HYDROCHLORIDE 20 MG/ML
5 INJECTION INTRAMUSCULAR; INTRAVENOUS EVERY 30 MIN PRN
Status: DISCONTINUED | OUTPATIENT
Start: 2023-11-30 | End: 2023-11-30 | Stop reason: HOSPADM

## 2023-11-30 RX ORDER — FENTANYL CITRATE 50 UG/ML
INJECTION, SOLUTION INTRAMUSCULAR; INTRAVENOUS AS NEEDED
Status: DISCONTINUED | OUTPATIENT
Start: 2023-11-30 | End: 2023-11-30

## 2023-11-30 RX ORDER — SODIUM CHLORIDE, SODIUM LACTATE, POTASSIUM CHLORIDE, CALCIUM CHLORIDE 600; 310; 30; 20 MG/100ML; MG/100ML; MG/100ML; MG/100ML
100 INJECTION, SOLUTION INTRAVENOUS CONTINUOUS
Status: DISCONTINUED | OUTPATIENT
Start: 2023-11-30 | End: 2023-11-30 | Stop reason: HOSPADM

## 2023-11-30 RX ORDER — FENTANYL CITRATE 50 UG/ML
50 INJECTION, SOLUTION INTRAMUSCULAR; INTRAVENOUS EVERY 5 MIN PRN
Status: DISCONTINUED | OUTPATIENT
Start: 2023-11-30 | End: 2023-11-30 | Stop reason: HOSPADM

## 2023-11-30 RX ORDER — MIDAZOLAM HYDROCHLORIDE 1 MG/ML
INJECTION, SOLUTION INTRAMUSCULAR; INTRAVENOUS AS NEEDED
Status: DISCONTINUED | OUTPATIENT
Start: 2023-11-30 | End: 2023-11-30

## 2023-11-30 RX ORDER — DEXAMETHASONE SODIUM PHOSPHATE 4 MG/ML
INJECTION, SOLUTION INTRA-ARTICULAR; INTRALESIONAL; INTRAMUSCULAR; INTRAVENOUS; SOFT TISSUE AS NEEDED
Status: DISCONTINUED | OUTPATIENT
Start: 2023-11-30 | End: 2023-11-30

## 2023-11-30 RX ORDER — LABETALOL HYDROCHLORIDE 5 MG/ML
INJECTION, SOLUTION INTRAVENOUS AS NEEDED
Status: DISCONTINUED | OUTPATIENT
Start: 2023-11-30 | End: 2023-11-30

## 2023-11-30 RX ORDER — CEPHALEXIN 500 MG/1
500 CAPSULE ORAL 2 TIMES DAILY
Qty: 14 CAPSULE | Refills: 0 | Status: SHIPPED | OUTPATIENT
Start: 2023-11-30 | End: 2023-12-12 | Stop reason: WASHOUT

## 2023-11-30 RX ORDER — ONDANSETRON HYDROCHLORIDE 2 MG/ML
4 INJECTION, SOLUTION INTRAVENOUS ONCE AS NEEDED
Status: DISCONTINUED | OUTPATIENT
Start: 2023-11-30 | End: 2023-11-30 | Stop reason: HOSPADM

## 2023-11-30 RX ORDER — ONDANSETRON HYDROCHLORIDE 2 MG/ML
INJECTION, SOLUTION INTRAVENOUS AS NEEDED
Status: DISCONTINUED | OUTPATIENT
Start: 2023-11-30 | End: 2023-11-30

## 2023-11-30 RX ORDER — PROPOFOL 10 MG/ML
INJECTION, EMULSION INTRAVENOUS AS NEEDED
Status: DISCONTINUED | OUTPATIENT
Start: 2023-11-30 | End: 2023-11-30

## 2023-11-30 RX ORDER — ROCURONIUM BROMIDE 10 MG/ML
INJECTION, SOLUTION INTRAVENOUS AS NEEDED
Status: DISCONTINUED | OUTPATIENT
Start: 2023-11-30 | End: 2023-11-30

## 2023-11-30 RX ORDER — LIDOCAINE HYDROCHLORIDE 20 MG/ML
INJECTION, SOLUTION INFILTRATION; PERINEURAL AS NEEDED
Status: DISCONTINUED | OUTPATIENT
Start: 2023-11-30 | End: 2023-11-30

## 2023-11-30 RX ORDER — LIDOCAINE HYDROCHLORIDE 20 MG/ML
JELLY TOPICAL AS NEEDED
Status: DISCONTINUED | OUTPATIENT
Start: 2023-11-30 | End: 2023-11-30 | Stop reason: HOSPADM

## 2023-11-30 RX ORDER — PHENAZOPYRIDINE HYDROCHLORIDE 100 MG/1
100 TABLET, FILM COATED ORAL 3 TIMES DAILY
Qty: 42 TABLET | Refills: 0 | Status: SHIPPED | OUTPATIENT
Start: 2023-11-30 | End: 2023-12-14

## 2023-11-30 RX ADMIN — LABETALOL HYDROCHLORIDE 5 MG: 5 INJECTION, SOLUTION INTRAVENOUS at 09:28

## 2023-11-30 RX ADMIN — FENTANYL CITRATE 50 MCG: 50 INJECTION INTRAMUSCULAR; INTRAVENOUS at 09:23

## 2023-11-30 RX ADMIN — ROCURONIUM BROMIDE 10 MG: 10 INJECTION, SOLUTION INTRAVENOUS at 10:09

## 2023-11-30 RX ADMIN — CEFAZOLIN SODIUM 2 G: 2 INJECTION, SOLUTION INTRAVENOUS at 09:11

## 2023-11-30 RX ADMIN — SUGAMMADEX 220 MG: 100 INJECTION, SOLUTION INTRAVENOUS at 10:51

## 2023-11-30 RX ADMIN — ONDANSETRON 4 MG: 2 INJECTION, SOLUTION INTRAMUSCULAR; INTRAVENOUS at 10:46

## 2023-11-30 RX ADMIN — PROPOFOL 30 MG: 10 INJECTION, EMULSION INTRAVENOUS at 10:45

## 2023-11-30 RX ADMIN — FENTANYL CITRATE 50 MCG: 50 INJECTION INTRAMUSCULAR; INTRAVENOUS at 09:15

## 2023-11-30 RX ADMIN — MIDAZOLAM 2 MG: 1 INJECTION INTRAMUSCULAR; INTRAVENOUS at 09:08

## 2023-11-30 RX ADMIN — PROPOFOL 170 MG: 10 INJECTION, EMULSION INTRAVENOUS at 09:15

## 2023-11-30 RX ADMIN — SODIUM CHLORIDE, SODIUM LACTATE, POTASSIUM CHLORIDE, AND CALCIUM CHLORIDE 100 ML/HR: 600; 310; 30; 20 INJECTION, SOLUTION INTRAVENOUS at 07:50

## 2023-11-30 RX ADMIN — ROCURONIUM BROMIDE 50 MG: 10 INJECTION, SOLUTION INTRAVENOUS at 09:17

## 2023-11-30 RX ADMIN — LIDOCAINE HYDROCHLORIDE 50 MG: 20 INJECTION, SOLUTION INFILTRATION; PERINEURAL at 09:15

## 2023-11-30 RX ADMIN — DEXAMETHASONE SODIUM PHOSPHATE 4 MG: 4 INJECTION, SOLUTION INTRAMUSCULAR; INTRAVENOUS at 09:23

## 2023-11-30 SDOH — HEALTH STABILITY: MENTAL HEALTH: CURRENT SMOKER: 1

## 2023-11-30 ASSESSMENT — COLUMBIA-SUICIDE SEVERITY RATING SCALE - C-SSRS
1. IN THE PAST MONTH, HAVE YOU WISHED YOU WERE DEAD OR WISHED YOU COULD GO TO SLEEP AND NOT WAKE UP?: NO
2. HAVE YOU ACTUALLY HAD ANY THOUGHTS OF KILLING YOURSELF?: NO
6. HAVE YOU EVER DONE ANYTHING, STARTED TO DO ANYTHING, OR PREPARED TO DO ANYTHING TO END YOUR LIFE?: NO

## 2023-11-30 ASSESSMENT — PAIN - FUNCTIONAL ASSESSMENT
PAIN_FUNCTIONAL_ASSESSMENT: 0-10

## 2023-11-30 ASSESSMENT — PAIN SCALES - GENERAL
PAINLEVEL_OUTOF10: 0 - NO PAIN
PAIN_LEVEL: 2
PAINLEVEL_OUTOF10: 0 - NO PAIN
PAINLEVEL_OUTOF10: 0 - NO PAIN

## 2023-11-30 NOTE — ANESTHESIA PROCEDURE NOTES
Airway  Date/Time: 11/30/2023 9:19 AM  Urgency: elective      Staffing  Performed: CRNA   Authorized by: Shawn Lee MD    Performed by: AUBRIE Osorio-JACQUELIN  Patient location during procedure: OR    Indications and Patient Condition  Indications for airway management: anesthesia and airway protection  Spontaneous Ventilation: absent  Sedation level: deep  Preoxygenated: yes  Patient position: sniffing  MILS maintained throughout  Mask difficulty assessment: 1 - vent by mask    Final Airway Details  Final airway type: endotracheal airway      Successful airway: ETT  Cuffed: yes   Successful intubation technique: direct laryngoscopy  Facilitating devices/methods: intubating stylet  Blade: Refugio  Blade size: #4  ETT size (mm): 7.5  Cormack-Lehane Classification: grade I - full view of glottis  Placement verified by: chest auscultation and capnometry   Measured from: lips  ETT to lips (cm): 23  Number of attempts at approach: 1

## 2023-11-30 NOTE — OP NOTE
Prostate Resection Transurethral HoLep Operative Note     Date: 2023  OR Location: [facility]    Name: Shawn Alfonso, : 1955, Age: 68 y.o., MRN: [id number], Sex: male    Diagnosis  Pre-op Diagnosis     * BPH with obstruction/lower urinary tract symptoms [N40.1, N13.8] Post-op Diagnosis     * BPH with obstruction/lower urinary tract symptoms [N40.1, N13.8]     Procedures  Prostate Resection Transurethral HoLep  38976 - NM TRURL ELECTROSURG RESCJ PROSTATE BLEED COMPLETE      Surgeons      * Ludivina Martinez - Primary    Resident/Fellow/Other Assistant:  Surgeon(s) and Role:    Procedure Summary  Anesthesia: General  ASA: II  Anesthesia Staff: Anesthesiologist: Shawn Lee MD  CRNA: AUBRIE Osorio-JACQUELIN  Estimated Blood Loss: 5mL  Intra-op Medications:   Medication Name Total Dose   lactated Ringer's infusion Cannot be calculated   ceFAZolin in dextrose (iso-os) (Ancef) IVPB 2 g 2 g              Anesthesia Record               Intraprocedure I/O Totals          Intake    Propofol Drip 0.00 mL    The total shown is the total volume documented since Anesthesia Start was filed.    Total Intake 0 mL          Specimen:   ID Type Source Tests Collected by Time   1 : SPECIMEN PLACED IN FORMALIN AND SENT TO LAB FOR ANALYSIS Tissue PROSTATE HOLEP SURGICAL PATHOLOGY EXAM Ludivina Martinez MD 2023 1037        Staff:   Circulator: Mira Nieves RN  Scrub Person: Kiersten Martinez; Brunilda Shepherd RN         Drains and/or Catheters:   Urethral Catheter  24 Fr. (Active)       Tourniquet Times:         Implants:     Findings: BPH    Indications: Shawn Alfonso is an 68 y.o. male who is having surgery for BPH with obstruction/lower urinary tract symptoms [N40.1, N13.8].     The patient was seen in the preoperative area. The risks, benefits, complications, treatment options, non-operative alternatives, expected recovery and outcomes were discussed with the patient. The possibilities of reaction to  medication, pulmonary aspiration, injury to surrounding structures, bleeding, recurrent infection, the need for additional procedures, failure to diagnose a condition, and creating a complication requiring transfusion or operation were discussed with the patient. The patient concurred with the proposed plan, giving informed consent.  The site of surgery was properly noted/marked if necessary per policy. The patient has been actively warmed in preoperative area. Preoperative antibiotics have been ordered and given within 1 hours of incision. Venous thrombosis prophylaxis have been ordered including bilateral sequential compression devices    Procedure Details: Preoperative diagnosis: BPH with LUTs    Postoperative diagnosis: same    Procedure: Holmium laser nucleation of prostate and tissue morcellation    Anesthesia: general    IVF: see anesthesia report    EBL: 5 ml    Complications: none    Catheters: 22 Fr 3-way White catheter    Specimen: prostate chips    Disposition: PACU in stable condition       Enucleation time: 35  Total laser time: 45  Total energy used: 254,000       Patient is a 68 year-old male with significant obstructive and irritative voiding symptoms not responsive to maximal medical therapy.  Ambulatory evaluation demonstrated him to be a good candidate for HoLEP procedure.  Risks and alternatives were discussed in great detail, he singed the informed consent and agreed to proceed    50 ml of lubricant were injected to the urethra.  Urethral meatus was dilated with repeat sounds to 30 Fr caliber    A 26 [place] Paz resectoscope was inserted.  The anterior urethra was normal, there was good coaptation of the membranous urethra, there was a large obstructing prostate.  The bladder was normal without stones or lesions.  Both ureteral orifices were identified.    We started the enucleation using a 550 µm holmium laser fiber.    A circumferential incision was made in the urethra proximal to the  sphincter.  It was deepened anteriorly and laterally.  We then entered the space between the capsule and the adenoma bluntly lateral to the verumontanum.  This was done bilaterally.  The posterior plane was developed bilaterally and connected by cutting the fibers proximal to the verumontanum.  The resection was carried as far proximally as possible.    We then returned to the initial incision in the urethra and detached the lateral attachments between the sphincter and the adenoma.  We were able to identify the lateral plane and developed it as proximally as possible by connecting it to the posterior plane.    We then turned to free the anterior portion of the sphincter.  The attachments between the sphincter and the anterior adenoma were taken down with the laser fiber until we met the anterior plan.  We then connected all planes circumferentially.  We continued the enucleation circumferentially until we reached the bladder neck.  The bladder neck was entered anteriorly and good hemostasis was obtained.  Then the bladder neck incision was developed circumferentially around the adenoma.  Adenoma was then rolled into the bladder and residual posterior attachments were removed.    Hemostasis was performed.The laser bridge was removed and the nephroscope loaded with the Piranha morcellator was inserted.  2 irrigations were connected to distended bladder.  Tissue was completely morcellated.    The laser bridge was inserted again and meticulous hemostasis was performed.  I verified that there was no residual tissue in the bladder, and that ureteral orifices were free.    The laser apparatus was removed and a 22 Macedonian three-way catheter was inserted and connected to irrigation.    Patient was extubated and moved to the postoperative area in stable condition.         Disposition: patient will have a trial of void on postoperative day one    Complications:  None; patient tolerated the procedure well.    Disposition: PACU  - hemodynamically stable.  Condition: stable         Additional Details: na    Attending Attestation: I was present and scrubbed for the entire procedure.    Ludivina Martinez  Phone Number: [phone]

## 2023-11-30 NOTE — ANESTHESIA POSTPROCEDURE EVALUATION
Patient: Shawn Alfonso    Procedure Summary       Date: 11/30/23 Room / Location: [facility] / [facility]    Anesthesia Start: 0909 Anesthesia Stop: 1106    Procedure: Prostate Resection Transurethral HoLep Diagnosis:       BPH with obstruction/lower urinary tract symptoms      (BPH with obstruction/lower urinary tract symptoms [N40.1, N13.8])    Surgeons: Ludivina Martinez MD Responsible Provider: Shawn Lee MD    Anesthesia Type: general ASA Status: 2            Anesthesia Type: general    Vitals Value Taken Time   /78 11/30/23 1223   Temp 36.7 °C (98.1 °F) 11/30/23 1223   Pulse 60 11/30/23 1223   Resp 16 11/30/23 1223   SpO2 95 % 11/30/23 1223       Anesthesia Post Evaluation    Patient location during evaluation: PACU  Patient participation: complete - patient participated  Level of consciousness: awake and alert  Pain score: 2  Pain management: satisfactory to patient  Airway patency: patent  Cardiovascular status: hemodynamically stable  Respiratory status: acceptable  Hydration status: acceptable  Postoperative Nausea and Vomiting: none  Comments: PONV absent        No notable events documented.

## 2023-11-30 NOTE — ANESTHESIA PREPROCEDURE EVALUATION
Patient: Shawn Alfonso    Procedure Information       Date/Time: 11/30/23 0900    Procedure: Prostate Resection Transurethral HoLep    Location: [facility] / [facility]    Surgeons: Ludivina Martinez MD            Relevant Problems   Cardiovascular   (+) Benign hypertension   (+) Hyperlipidemia      Neuro/Psych   (+) Anxiety disorder   (+) Left sided sciatica      Pulmonary   (+) SABINE (obstructive sleep apnea)      Hematology   (+) Polycythemia      Eyes, Ears, Nose, and Throat   (+) Glaucoma     Past Surgical History:   Procedure Laterality Date    CATARACT EXTRACTION Bilateral 05/28/2013    Cataract Surgery    KNEE SURGERY Bilateral 05/28/2013    B/L knee scopes    OTHER SURGICAL HISTORY Left 05/28/2013    Eye Surgery Results Retina Reattached    UMBILICAL HERNIA REPAIR  02/20/2014    Umbilical Hernia Repair       Clinical information reviewed:   Tobacco  Allergies  Meds   Med Hx  Surg Hx   Fam Hx  Soc Hx        NPO Detail:  NPO/Void Status  Carbonhydrate Drink Given Prior to Surgery? : N  Date of Last Liquid: 11/29/23  Time of Last Liquid: 1900  Date of Last Solid: 11/29/23  Time of Last Solid: 1700  Last Intake Type: Light meal  Time of Last Void: 0730         Physical Exam    Airway  Mallampati: II  TM distance: >3 FB  Neck ROM: full     Cardiovascular   Comments: deferred   Dental    Pulmonary   Comments: deferred   Abdominal     Comments: deferred           Anesthesia Plan    ASA 2     general   (ETT)  The patient is a current smoker.  Patient was previously instructed to abstain from smoking on day of procedure.  Patient did not smoke on day of procedure.    intravenous induction   Postoperative administration of opioids is intended.  Anesthetic plan and risks discussed with patient.  Use of blood products discussed with patient who.    Plan discussed with CRNA.

## 2023-11-30 NOTE — PERIOPERATIVE NURSING NOTE
Pt urine remains clear darker pink. No clots, pt tolerates po well. No c/o pain or ponv. Pt ready for phase 2.

## 2023-11-30 NOTE — DISCHARGE INSTRUCTIONS
Diet  You can eat whatever you like after your surgery. Sometimes the anesthesia can cause nausea, so it may be a good idea to stay away from heavy foods right after you get home from the procedure.    Becerril catheter  You will have a tube in the bladder called a becerril catheter. This drains urine from the bladder and exits the penis. Be sure the catheter is well secured to the leg at all times. This catheter typically stays in from one day to a week (7 days) depending on your circumstances. There should never be any tension or tugging on the catheter. Take care not to pull on the catheter when rolling in bed or changing position. The nurses will show you how to attach the becerril catheter to a leg bag during the day and a big bag at night.    The becerril catheter has a balloon on the end of it to keep it in place in the bladder. This may give you the feeling you need to urinate. Be assured the catheter is draining and the sensation is from the becerril catheter balloon. You may also notice urine or blood-tinged urine leaking around the catheter out the tip of the penis. Typically, this due to a bladder spasm and is not a cause for alarm. If the catheter stops draining, get up and walk around. If it is still not draining, call the office or come to the emergency room as it may be clogged and need to be irrigated. Once the becerril catheter is removed, it is normal to have burning and stinging with urination for a few weeks after surgery. It is also common to have more frequent urination and a greater sense of the urge to urinate. There may not be much warning from the time you feel the urge to urinate to the time when the bladder is ready to empty.    Activity  It is very important to walk after your procedure. Walking prevents blood clots in the legs or lungs. You may go up and down stairs. Avoid any strenuous activity or lifting more than ten pounds for 3 to 4 weeks. This includes any heavy lifting, running, riding a bicycle  or golf. This also includes activities such as raking leaves, mowing the lawn, shoveling snow or other strenuous chores. If you see blood in the urine, increase the amount of water you are drinking and avoid strenuous activity or heavy lifting until the blood clears.    Medications  Take the medications prescribed at the time of your discharge from the hospital. If you are taking any medications on a regular basis prior to your admission to the hospital, you should continue to take those as well. For any aches, pains or headaches, you may use Tylenol or Extra-Strength Tylenol. Sometimes, you will be given a prescription for other pain killers. Do not use any aspirin or aspirin-like compounds or ibuprofen products (NSAIDs) (eg. Advil, Nuprin, Motrin, Bufferin, etc.) for four weeks after surgery. If you start aspirin or aspirin like compounds and you notice blood in your urine, please stop taking it and increase your water intake. Pyridium will be called in that will help with burning.  It will color your urine orange.  Antibiotic will be also prescribed for 1 week.    Avoid constipation  Anesthesia, surgery and narcotic pain medication all increase your risk for constipation. Do not strain to move the bowels as this can impair the healing process and start bleeding. Take plenty of fiber, water and over the counter stool softener to avoid constipation. Stool softener can be taken by mouth twice a day to avoid constipation. A stool softener or laxative is available at any drug store without a prescription (senna or Senokot or SennaGen, Dulcolax or bisacodyl, Miralax, Metamucil, Milk of Magnesia or magnesium hydroxide). Decrease or hold the stool softener for diarrhea or loose stools.    Follow up plan  If you develop a fever greater than 101 degrees Fahrenheit, the catheter stops draining or you are unable to urinate, call the office or come to the emergency room.  Your catheter removal has been scheduled  Please call  [phone] and follow prompts for Dr. Martinez's  if you are not sure of your appointment time or location

## 2023-12-01 ENCOUNTER — OFFICE VISIT (OUTPATIENT)
Dept: UROLOGY | Facility: HOSPITAL | Age: 68
End: 2023-12-01
Payer: COMMERCIAL

## 2023-12-01 DIAGNOSIS — N13.8 BPH WITH OBSTRUCTION/LOWER URINARY TRACT SYMPTOMS: Primary | ICD-10-CM

## 2023-12-01 DIAGNOSIS — N40.1 BPH WITH OBSTRUCTION/LOWER URINARY TRACT SYMPTOMS: Primary | ICD-10-CM

## 2023-12-01 PROCEDURE — 51700 IRRIGATION OF BLADDER: CPT | Performed by: NURSE PRACTITIONER

## 2023-12-01 PROCEDURE — 1159F MED LIST DOCD IN RCRD: CPT | Performed by: NURSE PRACTITIONER

## 2023-12-01 PROCEDURE — 1160F RVW MEDS BY RX/DR IN RCRD: CPT | Performed by: NURSE PRACTITIONER

## 2023-12-01 PROCEDURE — 1126F AMNT PAIN NOTED NONE PRSNT: CPT | Performed by: NURSE PRACTITIONER

## 2023-12-01 PROCEDURE — 99024 POSTOP FOLLOW-UP VISIT: CPT | Performed by: NURSE PRACTITIONER

## 2023-12-01 NOTE — PROGRESS NOTES
Shawn Alfonso is here for a trial of void. It was explained in detail what the procedure entails, patient understands. Bladder was filled to 200 ml  with sterile water without difficulty. Patient voided 200 ml leaving a residual of 0 ml. Patient tolerated the procedure well.

## 2023-12-01 NOTE — PROGRESS NOTES
Subjective   Patient ID: Shawn Alfonso is a 68 y.o. male presenting for TOV s/p HoLEP with Dr. Martinez 11/30/2023    HPI  History of BPH with LUTS He has been doing well since surgery. Urine has become clear, yellow without evidence of gross hematuria or clots. He denies any fevers or chills.    Review of Systems  All other systems have been reviewed and are negative for complaint.    Objective   Physical Exam  General: Well developed, well nourished, alert and cooperative, appears in no acute distress  Eyes: Non-injected conjunctiva, sclera clear, no proptosis  Cardiac: Extremities are warm and well perfused. No edema, cyanosis or pallor.   Lungs: Breathing is easy, non-labored. Speaking in clear and complete sentences. Normal diaphragmatic movement.  MSK: Ambulatory with steady gait, unassisted  Neuro: alert and oriented to person, place and time  Psych: Demonstrates good judgement and reason, without hallucinations, abnormal affect or abnormal behaviors.  Skin: no obvious lesions, no rashes.    Assessment/Plan   There are no diagnoses linked to this encounter.    We discussed postoperative urinary retention in great detail. We discussed that different medications, including anesthesia can influence urinary retention. We discussed that postoperative constipation can also contribute to urinary retention. We discussed management of urinary retention to include indwelling catheter or CIC. We discussed risks of unmanaged urinary retention including irreversible kidney injury and increased risk of UTI. We discussed TOV today.    [] TOV today passed   [] Drink plenty of fluids to maintain hydration and clear urine output  [] You may have some irritative voiding symptoms over the next few days: burning, frequency, urgency  [] Activity restrictions reviewed    He will return to clinic in 3 months for post-operative visit with Dr. Martinez, or sooner if needed.    All questions and concerns were addressed. Patient  verbalizes understanding and has no other questions at this time.   Laura Vela-- REVA ALFRED  Office Phone:  [phone]

## 2023-12-05 ASSESSMENT — ENCOUNTER SYMPTOMS
HYPERTENSION: 1
SWEATS: 0
SHORTNESS OF BREATH: 0
NECK PAIN: 0
PND: 0
ORTHOPNEA: 0
BLURRED VISION: 0
HEADACHES: 0
PALPITATIONS: 0

## 2023-12-06 ASSESSMENT — PAIN SCALES - GENERAL: PAINLEVEL_OUTOF10: 0 - NO PAIN

## 2023-12-12 ENCOUNTER — OFFICE VISIT (OUTPATIENT)
Dept: PRIMARY CARE | Facility: CLINIC | Age: 68
End: 2023-12-12
Payer: COMMERCIAL

## 2023-12-12 VITALS
TEMPERATURE: 97.3 F | WEIGHT: 181.4 LBS | DIASTOLIC BLOOD PRESSURE: 80 MMHG | BODY MASS INDEX: 28.47 KG/M2 | SYSTOLIC BLOOD PRESSURE: 128 MMHG

## 2023-12-12 DIAGNOSIS — I10 BENIGN HYPERTENSION: Primary | ICD-10-CM

## 2023-12-12 DIAGNOSIS — E78.5 HYPERLIPIDEMIA, UNSPECIFIED HYPERLIPIDEMIA TYPE: ICD-10-CM

## 2023-12-12 DIAGNOSIS — F17.200 TOBACCO USE DISORDER: ICD-10-CM

## 2023-12-12 DIAGNOSIS — N40.1 BPH WITH OBSTRUCTION/LOWER URINARY TRACT SYMPTOMS: ICD-10-CM

## 2023-12-12 DIAGNOSIS — Z00.00 WELL ADULT EXAM: ICD-10-CM

## 2023-12-12 DIAGNOSIS — N13.8 BPH WITH OBSTRUCTION/LOWER URINARY TRACT SYMPTOMS: ICD-10-CM

## 2023-12-12 PROBLEM — R35.0 URINE FREQUENCY: Status: RESOLVED | Noted: 2023-02-08 | Resolved: 2023-12-12

## 2023-12-12 PROBLEM — N40.0 BPH (BENIGN PROSTATIC HYPERPLASIA): Status: RESOLVED | Noted: 2023-02-08 | Resolved: 2023-12-12

## 2023-12-12 LAB
LABORATORY COMMENT REPORT: NORMAL
PATH REPORT.FINAL DX SPEC: NORMAL
PATH REPORT.GROSS SPEC: NORMAL
PATH REPORT.RELEVANT HX SPEC: NORMAL
PATH REPORT.TOTAL CANCER: NORMAL

## 2023-12-12 PROCEDURE — 3079F DIAST BP 80-89 MM HG: CPT | Performed by: INTERNAL MEDICINE

## 2023-12-12 PROCEDURE — 4004F PT TOBACCO SCREEN RCVD TLK: CPT | Performed by: INTERNAL MEDICINE

## 2023-12-12 PROCEDURE — 1159F MED LIST DOCD IN RCRD: CPT | Performed by: INTERNAL MEDICINE

## 2023-12-12 PROCEDURE — 99214 OFFICE O/P EST MOD 30 MIN: CPT | Performed by: INTERNAL MEDICINE

## 2023-12-12 PROCEDURE — 1126F AMNT PAIN NOTED NONE PRSNT: CPT | Performed by: INTERNAL MEDICINE

## 2023-12-12 PROCEDURE — 1160F RVW MEDS BY RX/DR IN RCRD: CPT | Performed by: INTERNAL MEDICINE

## 2023-12-12 PROCEDURE — 3074F SYST BP LT 130 MM HG: CPT | Performed by: INTERNAL MEDICINE

## 2023-12-12 ASSESSMENT — ENCOUNTER SYMPTOMS
ORTHOPNEA: 0
PALPITATIONS: 0
SWEATS: 0
BLURRED VISION: 0
PND: 0
HYPERTENSION: 1
HEADACHES: 0
NECK PAIN: 0
SHORTNESS OF BREATH: 0

## 2023-12-12 ASSESSMENT — PATIENT HEALTH QUESTIONNAIRE - PHQ9
1. LITTLE INTEREST OR PLEASURE IN DOING THINGS: NOT AT ALL
2. FEELING DOWN, DEPRESSED OR HOPELESS: NOT AT ALL
SUM OF ALL RESPONSES TO PHQ9 QUESTIONS 1 AND 2: 0

## 2023-12-12 NOTE — PATIENT INSTRUCTIONS
Stay focused on healthy lifestyle.  Keep working on stopping tobacco.  Schedule your colonoscopy ASAP.  I would recommend a flu vaccine, COVID booster, Prevnar 20 pneumonia vaccine as well as the shingles vaccine.  Come back to see me in about 6 months but have blood work in 3 months.

## 2023-12-12 NOTE — PROGRESS NOTES
"Subjective   Patient ID: Shawn Aflonso is a 68 y.o. male who presents for f/u      Hypertension  This is a recurrent problem. The current episode started more than 1 month ago. The problem has been gradually improving since onset. The problem is controlled. Pertinent negatives include no anxiety, blurred vision, chest pain, headaches, malaise/fatigue, neck pain, orthopnea, palpitations, peripheral edema, PND, shortness of breath or sweats. There are no associated agents to hypertension. There are no known risk factors for coronary artery disease. There are no compliance problems.       #1 BPH-  s/p HoLEAP a few weeks PTV.  No complications.   #2 hyperlipidemia- on rx, no diff  #3 anxiety disorder - \"good\"   #4 nicotine dependence- rare tob use  #5 colon polyps- no change BMs  #6 htn- at home 120s.  No HA, CP, dizziness.   #7 polycythemia    Review of Systems   Constitutional:  Negative for malaise/fatigue.   Eyes:  Negative for blurred vision.   Respiratory:  Negative for shortness of breath.    Cardiovascular:  Negative for chest pain, palpitations, orthopnea and PND.   Musculoskeletal:  Negative for neck pain.   Neurological:  Negative for headaches.   All other systems reviewed and are negative.      Objective   /80   Temp 36.3 °C (97.3 °F)   Wt 82.3 kg (181 lb 6.4 oz)   BMI 28.47 kg/m²     Physical Exam  Constitutional:       General: He is not in acute distress.     Appearance: Normal appearance. He is not ill-appearing or toxic-appearing.   Cardiovascular:      Rate and Rhythm: Normal rate and regular rhythm.      Heart sounds: No murmur heard.  Pulmonary:      Effort: Pulmonary effort is normal.      Breath sounds: Normal breath sounds.   Abdominal:      General: Abdomen is flat.      Palpations: Abdomen is soft. There is no mass.      Tenderness: There is no abdominal tenderness.   Neurological:      Mental Status: He is alert and oriented to person, place, and time.   Psychiatric:         Mood " and Affect: Mood normal.         Thought Content: Thought content normal.         Judgment: Judgment normal.     Lab Results   Component Value Date    WBC 5.2 11/21/2023    HGB 15.7 11/21/2023    HCT 46.6 11/21/2023     11/21/2023    CHOL 173 03/22/2023    TRIG 95 03/22/2023    HDL 70.7 03/22/2023    ALT 22 03/22/2023    AST 20 10/22/2018     11/21/2023    K 4.5 11/21/2023     11/21/2023    CREATININE 1.04 11/21/2023    BUN 16 11/21/2023    CO2 25 11/21/2023    PSA 2.00 03/22/2023    INR 0.9 11/21/2023       Assessment/Plan       #1 BPH-better status post treatment.  Follow-up urology  #2 hyperlipidemia- check labs. Continue treatment.  #3 anxiety disorder -Stable. Continue treatment   #4 nicotine dependence- reviewed importance of smoking cessation again. Reviewed need to discontinue, reviewed strategies. Recommend CT chest, he declines.  His smoking history is relatively mild, approximately 10 pack years, smokes cigars. Spent 5 minutes discussing importance of discontinuing.  #5 colon polyps- follow up scope long overdue! reviewed w/ pt at length again. Again, stressed importance.  reviewed risk of delayed dx of colon CA. He has information to sched. Again I stressed importance.  #6 htn- good, con't rx.  #7 polycythemia- resolved.    #8 snoring, witnessed apneas, increased blood pressure. Sleep study ordered--> he still declines.  Reviewed importance with patient.       Patient will consider tetanus booster, declines now.   Declines shingles vaccine.  Reviewed again.  prevnar 20--> rec. Reviewed again w/ pt.

## 2024-02-02 DIAGNOSIS — K21.9 GASTROESOPHAGEAL REFLUX DISEASE, UNSPECIFIED WHETHER ESOPHAGITIS PRESENT: ICD-10-CM

## 2024-02-02 DIAGNOSIS — E78.5 HYPERLIPIDEMIA, UNSPECIFIED HYPERLIPIDEMIA TYPE: Primary | ICD-10-CM

## 2024-02-02 RX ORDER — ATORVASTATIN CALCIUM 40 MG/1
TABLET, FILM COATED ORAL
Qty: 90 TABLET | Refills: 3 | Status: SHIPPED | OUTPATIENT
Start: 2024-02-02

## 2024-02-02 RX ORDER — FAMOTIDINE 40 MG/1
40 TABLET, FILM COATED ORAL 2 TIMES DAILY
Qty: 180 TABLET | Refills: 3 | Status: SHIPPED | OUTPATIENT
Start: 2024-02-02

## 2024-02-26 DIAGNOSIS — I10 BENIGN HYPERTENSION: ICD-10-CM

## 2024-02-26 RX ORDER — VALSARTAN 160 MG/1
160 TABLET ORAL DAILY
Qty: 90 TABLET | Refills: 3 | Status: SHIPPED | OUTPATIENT
Start: 2024-02-26

## 2024-03-06 ENCOUNTER — OFFICE VISIT (OUTPATIENT)
Dept: UROLOGY | Facility: CLINIC | Age: 69
End: 2024-03-06
Payer: COMMERCIAL

## 2024-03-06 VITALS — HEIGHT: 67 IN | WEIGHT: 181 LBS | TEMPERATURE: 96 F | BODY MASS INDEX: 28.41 KG/M2

## 2024-03-06 DIAGNOSIS — N13.8 BPH WITH OBSTRUCTION/LOWER URINARY TRACT SYMPTOMS: Primary | ICD-10-CM

## 2024-03-06 DIAGNOSIS — N40.1 BPH WITH OBSTRUCTION/LOWER URINARY TRACT SYMPTOMS: Primary | ICD-10-CM

## 2024-03-06 PROCEDURE — 99213 OFFICE O/P EST LOW 20 MIN: CPT | Performed by: UROLOGY

## 2024-03-06 PROCEDURE — 51798 US URINE CAPACITY MEASURE: CPT | Performed by: UROLOGY

## 2024-03-06 PROCEDURE — 1126F AMNT PAIN NOTED NONE PRSNT: CPT | Performed by: UROLOGY

## 2024-03-06 PROCEDURE — 1159F MED LIST DOCD IN RCRD: CPT | Performed by: UROLOGY

## 2024-03-06 PROCEDURE — 51741 ELECTRO-UROFLOWMETRY FIRST: CPT | Performed by: UROLOGY

## 2024-03-06 ASSESSMENT — PAIN SCALES - GENERAL: PAINLEVEL: 0-NO PAIN

## 2024-03-06 NOTE — PROGRESS NOTES
Shawn Alfonso is a 68 y.o. male with history of BPH s/p HoLEP on 11/30/2023 who presents for 3 month post-op visit. Patient reports nocturia x2 and occasional urinary urgency which is not bothersome. He is overall doing well and is happy with the outcome of the procedure.     Path: 31.2g of benign tissue removed.      IPSS: 6 and 2   PVR: 8ml   Uroflow: volume voided 74 ml, Q max 17 ml/min    Current Outpatient Medications on File Prior to Visit   Medication Sig Dispense Refill    acetaminophen (Tylenol) 500 mg tablet Take 2 tablets (1,000 mg) by mouth every 6 hours if needed for mild pain (1 - 3).      atorvastatin (Lipitor) 40 mg tablet TAKE 1 TABLET DAILY AS DIRECTED (NEED FOLLOW UP APPOINTMENT) 90 tablet 3    brimonidine (AlphaGAN P) 0.2 % ophthalmic solution Administer 1 drop into both eyes every 12 hours.      dorzolamide-timoloL (Cosopt) 22.3-6.8 mg/mL ophthalmic solution Administer 1 drop into both eyes 2 times a day.      famotidine (Pepcid) 40 mg tablet TAKE 1 TABLET TWICE A  tablet 3    fexofenadine (Allegra) 180 mg tablet Take 1 tablet (180 mg) by mouth once daily.      latanoprost (Xalatan) 0.005 % ophthalmic solution Administer 1 drop into both eyes once daily at bedtime.      sertraline (Zoloft) 100 mg tablet TAKE 2 TABLETS DAILY (WILL NEED AN APPOINTMENT FOR MORE REFILLS) (Patient taking differently: Take 2 tablets (200 mg) by mouth once daily at bedtime.) 180 tablet 3    valsartan (Diovan) 160 mg tablet TAKE 1 TABLET DAILY FOR BLOOD PRESSURE 90 tablet 3     No current facility-administered medications on file prior to visit.     Lab Results   Component Value Date    PSA 2.00 03/22/2023    PSA 1.85 04/28/2022    PSA 2.09 04/29/2020       Plan:  BPH s/p HoLEP on 11/30/2023     We will continue to monitor and follow up in 1 year with PSA prior.     All questions were answered to the patient's satisfaction. Patient agrees with the plan and wishes to proceed. Follow-up will be scheduled  appropriately.     Scribed for Dr. Martinez by Maria Teresa Bennett. I , Dr Martinez, have personally reviewed and agreed with the information entered by the Virtual Scribe.

## 2024-05-20 ENCOUNTER — LAB (OUTPATIENT)
Dept: LAB | Facility: LAB | Age: 69
End: 2024-05-20
Payer: COMMERCIAL

## 2024-05-20 DIAGNOSIS — Z00.00 WELL ADULT EXAM: ICD-10-CM

## 2024-05-20 LAB
ALT SERPL W P-5'-P-CCNC: 22 U/L (ref 10–52)
ANION GAP SERPL CALC-SCNC: 11 MMOL/L (ref 10–20)
BUN SERPL-MCNC: 13 MG/DL (ref 6–23)
CALCIUM SERPL-MCNC: 9.1 MG/DL (ref 8.6–10.3)
CHLORIDE SERPL-SCNC: 101 MMOL/L (ref 98–107)
CHOLEST SERPL-MCNC: 160 MG/DL (ref 0–199)
CHOLESTEROL/HDL RATIO: 2.3
CO2 SERPL-SCNC: 26 MMOL/L (ref 21–32)
CREAT SERPL-MCNC: 0.93 MG/DL (ref 0.5–1.3)
EGFRCR SERPLBLD CKD-EPI 2021: 90 ML/MIN/1.73M*2
ERYTHROCYTE [DISTWIDTH] IN BLOOD BY AUTOMATED COUNT: 14 % (ref 11.5–14.5)
EST. AVERAGE GLUCOSE BLD GHB EST-MCNC: 108 MG/DL
GLUCOSE SERPL-MCNC: 107 MG/DL (ref 74–99)
HBA1C MFR BLD: 5.4 %
HCT VFR BLD AUTO: 48.7 % (ref 41–52)
HDLC SERPL-MCNC: 70.4 MG/DL
HGB BLD-MCNC: 16.4 G/DL (ref 13.5–17.5)
LDLC SERPL CALC-MCNC: 77 MG/DL
MCH RBC QN AUTO: 30.6 PG (ref 26–34)
MCHC RBC AUTO-ENTMCNC: 33.7 G/DL (ref 32–36)
MCV RBC AUTO: 91 FL (ref 80–100)
NON HDL CHOLESTEROL: 90 MG/DL (ref 0–149)
NRBC BLD-RTO: 0 /100 WBCS (ref 0–0)
PLATELET # BLD AUTO: 223 X10*3/UL (ref 150–450)
POTASSIUM SERPL-SCNC: 4.8 MMOL/L (ref 3.5–5.3)
PSA SERPL-MCNC: 0.27 NG/ML
RBC # BLD AUTO: 5.36 X10*6/UL (ref 4.5–5.9)
SODIUM SERPL-SCNC: 133 MMOL/L (ref 136–145)
TRIGL SERPL-MCNC: 61 MG/DL (ref 0–149)
VLDL: 12 MG/DL (ref 0–40)
WBC # BLD AUTO: 4.9 X10*3/UL (ref 4.4–11.3)

## 2024-05-20 PROCEDURE — 84153 ASSAY OF PSA TOTAL: CPT

## 2024-05-20 PROCEDURE — 84460 ALANINE AMINO (ALT) (SGPT): CPT

## 2024-05-20 PROCEDURE — 83036 HEMOGLOBIN GLYCOSYLATED A1C: CPT

## 2024-05-20 PROCEDURE — 85027 COMPLETE CBC AUTOMATED: CPT

## 2024-05-20 PROCEDURE — 36415 COLL VENOUS BLD VENIPUNCTURE: CPT

## 2024-05-20 PROCEDURE — 80061 LIPID PANEL: CPT

## 2024-05-20 PROCEDURE — 80048 BASIC METABOLIC PNL TOTAL CA: CPT

## 2024-06-16 ASSESSMENT — ENCOUNTER SYMPTOMS
SWEATS: 0
NECK PAIN: 0
ORTHOPNEA: 0
BLURRED VISION: 0
SHORTNESS OF BREATH: 0
HEADACHES: 0
PALPITATIONS: 0
HYPERTENSION: 1
PND: 0

## 2024-06-17 ENCOUNTER — APPOINTMENT (OUTPATIENT)
Dept: PRIMARY CARE | Facility: CLINIC | Age: 69
End: 2024-06-17
Payer: COMMERCIAL

## 2024-06-17 VITALS
WEIGHT: 190 LBS | BODY MASS INDEX: 29.76 KG/M2 | SYSTOLIC BLOOD PRESSURE: 136 MMHG | TEMPERATURE: 97.3 F | DIASTOLIC BLOOD PRESSURE: 88 MMHG

## 2024-06-17 DIAGNOSIS — E78.5 HYPERLIPIDEMIA, UNSPECIFIED HYPERLIPIDEMIA TYPE: ICD-10-CM

## 2024-06-17 DIAGNOSIS — E87.1 HYPONATREMIA: Primary | ICD-10-CM

## 2024-06-17 DIAGNOSIS — I10 BENIGN HYPERTENSION: ICD-10-CM

## 2024-06-17 DIAGNOSIS — D12.6 ADENOMATOUS POLYP OF COLON, UNSPECIFIED PART OF COLON: ICD-10-CM

## 2024-06-17 DIAGNOSIS — G47.33 OSA (OBSTRUCTIVE SLEEP APNEA): ICD-10-CM

## 2024-06-17 PROCEDURE — 1160F RVW MEDS BY RX/DR IN RCRD: CPT | Performed by: INTERNAL MEDICINE

## 2024-06-17 PROCEDURE — 3079F DIAST BP 80-89 MM HG: CPT | Performed by: INTERNAL MEDICINE

## 2024-06-17 PROCEDURE — 3075F SYST BP GE 130 - 139MM HG: CPT | Performed by: INTERNAL MEDICINE

## 2024-06-17 PROCEDURE — 1159F MED LIST DOCD IN RCRD: CPT | Performed by: INTERNAL MEDICINE

## 2024-06-17 PROCEDURE — 99214 OFFICE O/P EST MOD 30 MIN: CPT | Performed by: INTERNAL MEDICINE

## 2024-06-17 ASSESSMENT — ENCOUNTER SYMPTOMS
NECK PAIN: 0
SHORTNESS OF BREATH: 0
PALPITATIONS: 0
HEADACHES: 0

## 2024-06-17 ASSESSMENT — PATIENT HEALTH QUESTIONNAIRE - PHQ9
SUM OF ALL RESPONSES TO PHQ9 QUESTIONS 1 AND 2: 0
2. FEELING DOWN, DEPRESSED OR HOPELESS: NOT AT ALL
1. LITTLE INTEREST OR PLEASURE IN DOING THINGS: NOT AT ALL

## 2024-06-17 NOTE — PROGRESS NOTES
"Subjective   Patient ID: Shawn Alfonso is a 69 y.o. male who presents for f/u         #1 BPH-  s/p HoLEAP a few weeks PTV.  No complications.   #2 hyperlipidemia- on rx, no diff  #3 anxiety disorder - \"good\"   #4 nicotine dependence- rare tob use  #5 colon polyps- no change BMs  #6 htn- at home 120s.  No HA, CP, dizziness.   #7 polycythemia    Review of Systems   Respiratory:  Negative for shortness of breath.    Cardiovascular:  Negative for chest pain and palpitations.   Musculoskeletal:  Negative for neck pain.   Neurological:  Negative for headaches.   All other systems reviewed and are negative.      Objective   There were no vitals taken for this visit.    Physical Exam  Constitutional:       General: He is not in acute distress.     Appearance: Normal appearance. He is not ill-appearing or toxic-appearing.   Cardiovascular:      Rate and Rhythm: Normal rate and regular rhythm.      Heart sounds: No murmur heard.  Pulmonary:      Effort: Pulmonary effort is normal.      Breath sounds: Normal breath sounds.   Abdominal:      General: Abdomen is flat.      Palpations: Abdomen is soft. There is no mass.      Tenderness: There is no abdominal tenderness.   Neurological:      Mental Status: He is alert and oriented to person, place, and time.   Psychiatric:         Mood and Affect: Mood normal.         Thought Content: Thought content normal.         Judgment: Judgment normal.     Lab Results   Component Value Date    WBC 4.9 05/20/2024    HGB 16.4 05/20/2024    HCT 48.7 05/20/2024     05/20/2024    CHOL 160 05/20/2024    TRIG 61 05/20/2024    HDL 70.4 05/20/2024    ALT 22 05/20/2024    AST 20 10/22/2018     (L) 05/20/2024    K 4.8 05/20/2024     05/20/2024    CREATININE 0.93 05/20/2024    BUN 13 05/20/2024    CO2 26 05/20/2024    PSA 0.27 05/20/2024    INR 0.9 11/21/2023    HGBA1C 5.4 05/20/2024       Assessment/Plan       #1 BPH-better status post treatment.  Follow-up urology  #2 " hyperlipidemia- check labs. Continue treatment.  #3 anxiety disorder -Stable. Continue treatment   #4 nicotine dependence- reviewed importance of smoking cessation again. Reviewed need to discontinue, reviewed strategies. Recommend CT chest, he declines.  His smoking history is relatively mild, approximately 10 pack years, smokes cigars. Spent 5 minutes discussing importance of discontinuing.  #5 colon polyps- follow up scope long overdue! reviewed w/ pt at length again. Again, stressed importance.  reviewed risk of delayed dx of colon CA. He has information to sched. Again I stressed importance.  Today he agrees to schedule, information provided to patient again.  #6 htn- good, con't rx.  #7 polycythemia- resolved.    #8 snoring, witnessed apneas, increased blood pressure. Sleep study ordered--> he still declines.  Reviewed importance with patient.  #9 hyponatremia-retest nonfasting.       Patient will consider tetanus booster, declines now.   Declines shingles vaccine.  Reviewed again.  prevnar 20--> rec. Reviewed again w/ pt.   Follow-up 6 months       Answers submitted by the patient for this visit:  High Blood Pressure Questionnaire (Submitted on 6/16/2024)  Chief Complaint: Hypertension  Chronicity: recurrent  Onset: more than 1 year ago  Progression since onset: resolved  Condition status: controlled  anxiety: No  blurred vision: No  malaise/fatigue: No  orthopnea: No  peripheral edema: No  PND: No  sweats: No  Agents associated with hypertension: NSAIDs  CAD risks: dyslipidemia, obesity, smoking/tobacco exposure  Compliance problems: no compliance problems

## 2024-06-26 DIAGNOSIS — Z12.11 COLON CANCER SCREENING: ICD-10-CM

## 2024-06-26 RX ORDER — POLYETHYLENE GLYCOL 3350, SODIUM CHLORIDE, SODIUM BICARBONATE, POTASSIUM CHLORIDE 420; 11.2; 5.72; 1.48 G/4L; G/4L; G/4L; G/4L
POWDER, FOR SOLUTION ORAL
Qty: 4000 ML | Refills: 0 | Status: SHIPPED | OUTPATIENT
Start: 2024-06-26

## 2024-07-01 DIAGNOSIS — F41.9 ANXIETY DISORDER, UNSPECIFIED TYPE: ICD-10-CM

## 2024-07-02 RX ORDER — SERTRALINE HYDROCHLORIDE 100 MG/1
TABLET, FILM COATED ORAL
Qty: 180 TABLET | Refills: 3 | Status: SHIPPED | OUTPATIENT
Start: 2024-07-02

## 2024-08-26 ENCOUNTER — APPOINTMENT (OUTPATIENT)
Dept: GASTROENTEROLOGY | Facility: EXTERNAL LOCATION | Age: 69
End: 2024-08-26
Payer: COMMERCIAL

## 2024-09-09 ENCOUNTER — APPOINTMENT (OUTPATIENT)
Dept: GASTROENTEROLOGY | Facility: EXTERNAL LOCATION | Age: 69
End: 2024-09-09
Payer: COMMERCIAL

## 2024-09-09 DIAGNOSIS — Z12.11 SCREEN FOR COLON CANCER: Primary | ICD-10-CM

## 2024-09-09 DIAGNOSIS — Z80.0 FAMILY HISTORY OF COLON CANCER: ICD-10-CM

## 2024-09-09 DIAGNOSIS — D12.2 BENIGN NEOPLASM OF ASCENDING COLON: ICD-10-CM

## 2024-09-09 DIAGNOSIS — D12.6 ADENOMATOUS POLYP OF COLON, UNSPECIFIED PART OF COLON: ICD-10-CM

## 2024-09-09 DIAGNOSIS — Z86.010 PERSONAL HISTORY OF COLONIC POLYPS: ICD-10-CM

## 2024-09-09 PROCEDURE — 88305 TISSUE EXAM BY PATHOLOGIST: CPT | Performed by: STUDENT IN AN ORGANIZED HEALTH CARE EDUCATION/TRAINING PROGRAM

## 2024-09-09 PROCEDURE — 88305 TISSUE EXAM BY PATHOLOGIST: CPT

## 2024-09-09 PROCEDURE — 45385 COLONOSCOPY W/LESION REMOVAL: CPT | Performed by: INTERNAL MEDICINE

## 2024-09-09 NOTE — PROGRESS NOTES
Patient has a family history of colon cancer and personal history of polyps his last colonoscopy was in 2011.  Today he had 9 polyps.  I recommend repeat exam in 3 years.

## 2024-09-10 ENCOUNTER — LAB REQUISITION (OUTPATIENT)
Dept: LAB | Facility: HOSPITAL | Age: 69
End: 2024-09-10
Payer: COMMERCIAL

## 2024-11-07 ENCOUNTER — LAB (OUTPATIENT)
Dept: LAB | Facility: LAB | Age: 69
End: 2024-11-07
Payer: COMMERCIAL

## 2024-11-07 DIAGNOSIS — E87.1 HYPONATREMIA: ICD-10-CM

## 2024-11-07 LAB
ANION GAP SERPL CALC-SCNC: 13 MMOL/L (ref 10–20)
BUN SERPL-MCNC: 12 MG/DL (ref 6–23)
CALCIUM SERPL-MCNC: 9.3 MG/DL (ref 8.6–10.3)
CHLORIDE SERPL-SCNC: 97 MMOL/L (ref 98–107)
CO2 SERPL-SCNC: 27 MMOL/L (ref 21–32)
CREAT SERPL-MCNC: 1.03 MG/DL (ref 0.5–1.3)
EGFRCR SERPLBLD CKD-EPI 2021: 80 ML/MIN/1.73M*2
GLUCOSE SERPL-MCNC: 97 MG/DL (ref 74–99)
POTASSIUM SERPL-SCNC: 4.5 MMOL/L (ref 3.5–5.3)
SODIUM SERPL-SCNC: 132 MMOL/L (ref 136–145)

## 2024-12-04 ENCOUNTER — LAB (OUTPATIENT)
Dept: LAB | Facility: LAB | Age: 69
End: 2024-12-04
Payer: COMMERCIAL

## 2024-12-04 DIAGNOSIS — E87.1 LOW SODIUM LEVELS: ICD-10-CM

## 2024-12-04 LAB
ANION GAP SERPL CALC-SCNC: 16 MMOL/L (ref 10–20)
BUN SERPL-MCNC: 15 MG/DL (ref 6–23)
CALCIUM SERPL-MCNC: 9.3 MG/DL (ref 8.6–10.3)
CHLORIDE SERPL-SCNC: 98 MMOL/L (ref 98–107)
CO2 SERPL-SCNC: 23 MMOL/L (ref 21–32)
CREAT SERPL-MCNC: 1 MG/DL (ref 0.5–1.3)
EGFRCR SERPLBLD CKD-EPI 2021: 82 ML/MIN/1.73M*2
GLUCOSE SERPL-MCNC: 96 MG/DL (ref 74–99)
POTASSIUM SERPL-SCNC: 5 MMOL/L (ref 3.5–5.3)
SODIUM SERPL-SCNC: 132 MMOL/L (ref 136–145)

## 2024-12-04 PROCEDURE — 80048 BASIC METABOLIC PNL TOTAL CA: CPT

## 2024-12-15 ASSESSMENT — ENCOUNTER SYMPTOMS
BLURRED VISION: 0
NECK PAIN: 0
PND: 0
SWEATS: 0
HEADACHES: 0
SHORTNESS OF BREATH: 0
ORTHOPNEA: 0
PALPITATIONS: 0
HYPERTENSION: 1

## 2024-12-17 ENCOUNTER — APPOINTMENT (OUTPATIENT)
Dept: PRIMARY CARE | Facility: CLINIC | Age: 69
End: 2024-12-17
Payer: COMMERCIAL

## 2024-12-17 VITALS
WEIGHT: 202 LBS | TEMPERATURE: 98.2 F | SYSTOLIC BLOOD PRESSURE: 138 MMHG | BODY MASS INDEX: 31.64 KG/M2 | DIASTOLIC BLOOD PRESSURE: 88 MMHG

## 2024-12-17 DIAGNOSIS — E78.5 HYPERLIPIDEMIA, UNSPECIFIED HYPERLIPIDEMIA TYPE: ICD-10-CM

## 2024-12-17 DIAGNOSIS — F41.9 ANXIETY DISORDER, UNSPECIFIED TYPE: ICD-10-CM

## 2024-12-17 DIAGNOSIS — E87.1 HYPONATREMIA: ICD-10-CM

## 2024-12-17 DIAGNOSIS — D75.1 POLYCYTHEMIA: ICD-10-CM

## 2024-12-17 DIAGNOSIS — H93.12 TINNITUS OF LEFT EAR: ICD-10-CM

## 2024-12-17 DIAGNOSIS — I10 BENIGN HYPERTENSION: Primary | ICD-10-CM

## 2024-12-17 LAB
CREAT UR-MCNC: 62.3 MG/DL (ref 20–370)
SODIUM UR-SCNC: 54 MMOL/L
SODIUM/CREAT UR-RTO: 87 MMOL/G CREAT

## 2024-12-17 PROCEDURE — 84300 ASSAY OF URINE SODIUM: CPT

## 2024-12-17 PROCEDURE — 3075F SYST BP GE 130 - 139MM HG: CPT | Performed by: INTERNAL MEDICINE

## 2024-12-17 PROCEDURE — 1160F RVW MEDS BY RX/DR IN RCRD: CPT | Performed by: INTERNAL MEDICINE

## 2024-12-17 PROCEDURE — 83935 ASSAY OF URINE OSMOLALITY: CPT

## 2024-12-17 PROCEDURE — 4004F PT TOBACCO SCREEN RCVD TLK: CPT | Performed by: INTERNAL MEDICINE

## 2024-12-17 PROCEDURE — 1159F MED LIST DOCD IN RCRD: CPT | Performed by: INTERNAL MEDICINE

## 2024-12-17 PROCEDURE — 82570 ASSAY OF URINE CREATININE: CPT

## 2024-12-17 PROCEDURE — 3079F DIAST BP 80-89 MM HG: CPT | Performed by: INTERNAL MEDICINE

## 2024-12-17 PROCEDURE — 99214 OFFICE O/P EST MOD 30 MIN: CPT | Performed by: INTERNAL MEDICINE

## 2024-12-17 RX ORDER — VALSARTAN 320 MG/1
320 TABLET ORAL DAILY
Qty: 30 TABLET | Refills: 11 | Status: SHIPPED | OUTPATIENT
Start: 2024-12-17 | End: 2025-12-17

## 2024-12-17 ASSESSMENT — ENCOUNTER SYMPTOMS
HEADACHES: 0
PALPITATIONS: 0
SHORTNESS OF BREATH: 0
NECK PAIN: 0

## 2024-12-17 ASSESSMENT — PATIENT HEALTH QUESTIONNAIRE - PHQ9
SUM OF ALL RESPONSES TO PHQ9 QUESTIONS 1 AND 2: 0
1. LITTLE INTEREST OR PLEASURE IN DOING THINGS: NOT AT ALL
2. FEELING DOWN, DEPRESSED OR HOPELESS: NOT AT ALL

## 2024-12-17 NOTE — PROGRESS NOTES
"Subjective   Patient ID: Shawn Alfonso is a 69 y.o. male who presents for f/u         #1 BPH-  s/p HoLEAP a few weeks PTV.  No complications.   #2 hyperlipidemia- on rx, no diff  #3 anxiety disorder - \"good\", self reduced to 100 mg po every day x 3-4 weeks.    #4 nicotine dependence- rare tob use  #5 colon polyps- no change BMs  #6 htn- at home 120s.  No HA, CP, dizziness.   #7 polycythemia    Review of Systems   Respiratory:  Negative for shortness of breath.    Cardiovascular:  Negative for chest pain and palpitations.   Musculoskeletal:  Negative for neck pain.   Neurological:  Negative for headaches.   All other systems reviewed and are negative.      Objective   /88   Temp 36.8 °C (98.2 °F)   Wt 91.6 kg (202 lb)   BMI 31.64 kg/m²     Physical Exam  Constitutional:       General: He is not in acute distress.     Appearance: Normal appearance. He is not ill-appearing or toxic-appearing.   Cardiovascular:      Rate and Rhythm: Normal rate and regular rhythm.      Heart sounds: No murmur heard.  Pulmonary:      Effort: Pulmonary effort is normal.      Breath sounds: Normal breath sounds.   Abdominal:      General: Abdomen is flat.      Palpations: Abdomen is soft. There is no mass.      Tenderness: There is no abdominal tenderness.   Neurological:      Mental Status: He is alert and oriented to person, place, and time.   Psychiatric:         Mood and Affect: Mood normal.         Thought Content: Thought content normal.         Judgment: Judgment normal.     Lab Results   Component Value Date    WBC 4.9 05/20/2024    HGB 16.4 05/20/2024    HCT 48.7 05/20/2024     05/20/2024    CHOL 160 05/20/2024    TRIG 61 05/20/2024    HDL 70.4 05/20/2024    ALT 22 05/20/2024    AST 20 10/22/2018     (L) 12/04/2024    K 5.0 12/04/2024    CL 98 12/04/2024    CREATININE 1.00 12/04/2024    BUN 15 12/04/2024    CO2 23 12/04/2024    PSA 0.27 05/20/2024    INR 0.9 11/21/2023    HGBA1C 5.4 05/20/2024 "       Assessment/Plan       #1 BPH-better status post treatment.  Follow-up urology  #2 hyperlipidemia- check labs. Continue treatment.  #3 anxiety disorder -Stable. Continue treatment.  Pt will c/s further tapering. Reviewed risk.     #4 nicotine dependence- reviewed importance of smoking cessation again. Reviewed need to discontinue, reviewed strategies. Recommend CT chest, he declines.  His smoking history is relatively mild, approximately 10 pack years, smokes cigars. Spent 5 minutes discussing importance of discontinuing.  #5 colon polyps- follow up scope 2027  #6 htn-  too high.  Increase valsartan to 320.  Home checks, f/u  via [facility] in 3 weeks.   #7 polycythemia- resolved.    #8 snoring, witnessed apneas, increased blood pressure. Sleep study ordered--> he still declines.  Reviewed importance with patient again.  #9 hyponatremia-reviewed.  Retest w/ urine studies.  Further eval pending.        Patient will consider tetanus booster, declines now.   Rec flu vaccine/ COVID boster  Declines shingles vaccine.  Reviewed again w/pt.  prevnar 20--> rec. Reviewed again w/ pt.     Follow-up 6 months       Answers submitted by the patient for this visit:  High Blood Pressure Questionnaire (Submitted on 6/16/2024)  Chief Complaint: Hypertension  Chronicity: recurrent  Onset: more than 1 year ago  Progression since onset: resolved  Condition status: controlled  anxiety: No  blurred vision: No  malaise/fatigue: No  orthopnea: No  peripheral edema: No  PND: No  sweats: No  Agents associated with hypertension: NSAIDs  CAD risks: dyslipidemia, obesity, smoking/tobacco exposure  Compliance problems: no compliance problems    Answers submitted by the patient for this visit:  High Blood Pressure Questionnaire (Submitted on 12/15/2024)  Chief Complaint: Hypertension  Chronicity: recurrent  Onset: more than 1 year ago  Progression since onset: unchanged  Condition status: controlled  anxiety: No  blurred vision:  No  malaise/fatigue: No  orthopnea: No  peripheral edema: No  PND: No  sweats: No  Agents associated with hypertension: no associated agents  CAD risks: dyslipidemia  Compliance problems: no compliance problems

## 2024-12-18 ENCOUNTER — OFFICE VISIT (OUTPATIENT)
Dept: OTOLARYNGOLOGY | Facility: CLINIC | Age: 69
End: 2024-12-18
Payer: COMMERCIAL

## 2024-12-18 VITALS — BODY MASS INDEX: 31.64 KG/M2 | WEIGHT: 202 LBS

## 2024-12-18 DIAGNOSIS — H93.12 TINNITUS OF LEFT EAR: ICD-10-CM

## 2024-12-18 DIAGNOSIS — H91.93 BILATERAL HEARING LOSS, UNSPECIFIED HEARING LOSS TYPE: ICD-10-CM

## 2024-12-18 DIAGNOSIS — H83.3X3 NOISE-INDUCED HEARING LOSS OF BOTH EARS: ICD-10-CM

## 2024-12-18 DIAGNOSIS — T16.2XXA FOREIGN BODY OF LEFT EAR, INITIAL ENCOUNTER: Primary | ICD-10-CM

## 2024-12-18 LAB — OSMOLALITY UR: 410 MOSM/KG (ref 200–1200)

## 2024-12-18 PROCEDURE — 69210 REMOVE IMPACTED EAR WAX UNI: CPT | Performed by: GENERAL PRACTICE

## 2024-12-18 PROCEDURE — 99204 OFFICE O/P NEW MOD 45 MIN: CPT | Performed by: GENERAL PRACTICE

## 2024-12-18 PROCEDURE — 1159F MED LIST DOCD IN RCRD: CPT | Performed by: GENERAL PRACTICE

## 2024-12-18 PROCEDURE — 4004F PT TOBACCO SCREEN RCVD TLK: CPT | Performed by: GENERAL PRACTICE

## 2024-12-18 NOTE — PROGRESS NOTES
Otolaryngology - Head and Neck Surgery Outpatient New Patient Visit Note        Assessment/Plan:   Problem List Items Addressed This Visit    None  Visit Diagnoses         Codes    Foreign body of left ear, initial encounter    -  Primary T16.2XXA    Tinnitus of left ear     H93.12    Bilateral hearing loss, unspecified hearing loss type     H91.93    Noise-induced hearing loss of both ears     H83.3X3            Mild intermittent L sided tinnitus with small hair resting on TM, but otherwise benign exam.  Signfiicant noise exposure history and subjective hearing loss.   Will acquire audiogram to aid in eval.    Consider MRI IAC if asymmetric SNHL.     Discussed conservative tinnitus management    Consider use of tinnitus management options, which include but are not limited to the following: sound therapy (use of pleasant or calming sounds that diminish the presence of tinnitus); mindfulness, meditation, and breathing exercises; sleep hygiene; mental health evaluation and formal therapies; psychiatric management of psychopharmaceuticals; dental/orthodontia evaluation; dietary changes (reduction of sodium, caffeine, alcohol); lifestyle changes (exercise, etc.); use of hearing protection and avoidance of loud noise; and formal tinnitus therapies (Tinnitus Retraining Therapy/ TRT, Progressive Tinnitus Management, Tinnitus Activities Treatment, Cognitive Behavioral Therapy); and Biomedical Neuromodulation ([place]).        Follow up:  -plan for follow up in clinic as needed    All of the above findings, impressions, treatment planning and follow up plans were discussed with the patient who indicated understanding.  the patient was instructed to contact or return to clinic sooner if symptoms/signs persist or worsen despite the above management.      Brenton Calderon MD  Otolaryngology - Head and Neck Surgery            History Of Present Illness  Shawn DAVIDSON Perdomobertacarlota is a 69 y.o. male presenting for L>R tinnitus.  Notes 1mo  history of mild intermittent L>R low tonal tinnitus.    Notes baseline hearing loss, no recent audiogram.   The patient reports slow progression of the hearing loss over time.  The paitent reports a history of intermittent, waxing/waning, nonpulsatile, tonal tinnitus which does not interfere with hearing.   The patient reports a history of significant noise exposure due to occupational exposures, industrial noise, etc.     The patient denies sudden changes in hearing.  The patient denies otalgia, otorrhea, vertigo, or facial weakness.    The patient denies a history of otologic surgery or trauma.  The patient denies a history of blast injury, TBI or concussion associated with hearing loss.  The patient denies a family history of significant hearing loss.  The patient reports a history of AOM as a child, but no recent significant history of ear infection.  No reported exposure to known ototoxins (chemotherapeutics, aminoglycosides, loop diurectics, high dose NSAIDs).   No reported history of radiation treatment to the head/neck.             Past Medical History  He has a past medical history of Anxiety, BPH (benign prostatic hyperplasia), Depression, GERD (gastroesophageal reflux disease), Glaucoma, Hyperlipidemia, and Hypertension.    Surgical History  He has a past surgical history that includes Knee surgery (Bilateral, 05/28/2013); Cataract extraction (Bilateral, 05/28/2013); Other surgical history (Left, 05/28/2013); and Umbilical hernia repair (02/20/2014).     Social History  He reports that he has been smoking cigars. He has never used smokeless tobacco. He reports current alcohol use of about 2.0 standard drinks of alcohol per week. He reports that he does not use drugs.    Family History  Family History   Problem Relation Name Age of Onset    Other (Heart valve replacement) Mother      Cancer Father      Prostate cancer Father          Allergies  Patient has no known allergies.    Review of Systems  ROS:  Pertinent positives as noted in HPI.    - CONSTITUTIONAL: Does not report weight loss, fever or chills.    - HEENT:   Ear: Does not report  , vertigo,    , otalgia, otorrhea  Nose: Does not report congestion, rhinorrhea, epistaxis, decreased smell  Throat: Does not report pain, dysphagia, odynophagia  Larynx: Does not report hoarseness,  difficulty breathing, pain with speaking (odynophonia)  Neck: Does not report new masses, pain, swelling  Face: Does not report sinus pain, pressure, swelling, numbness, weakness     - RESPIRATORY: Does not report SOB or cough.    - CV: Does not report palpitations or chest pain.     - GI: Does not report abdominal pain, nausea, vomiting or diarrhea.    - : Does not report dysuria or urinary frequency.    - MSK: Does not report myalgia or joint pain.    - SKIN: Does not report rash or pruritus.    - NEUROLOGICAL: Does not report headache or syncope.    - PSYCHIATRIC: Does not report recent changes in mood. Does not report anxiety or depression.         Physical Exam:     GENERAL:   Alert & Oriented to person, place and time; Normal affect and appearance. Well developed and well nourished. Conversant & cooperative with examination.     HEAD:   Normocephalic, atraumatic. No sinus tenderness to palpation. Normal parotid bilaterally. Normal facial strength.     NEUROLOGIC:   Cranial nerves II-XII grossly intact, gait WNL. Normal mood and affect.    EYES:   Extraocular movements intact. Pupils equal, round, reactive to light and accommodation. No nystagmus, no ptosis. no scleral injection.    EAR:   Normal auricle. No discomfort or TTP with manipulation.   Handheld otoscopic exam showed normal external auditory canals bilaterally. No purulence or EAC inflammation. Hair resting on left TM, removed with forceps  Right tympanic membrane clear and mobile without evidence of perforation, retraction or middle ear effusion.   Left tympanic membrane clear and mobile without evidence of  perforation, retraction or middle ear effusion.     NOSE:   No external deformity. No external nasal lesions, lacerations, or scars. Nasal tip symmetrical with normal nasal valves.   Nasal cavity with essentially midline septum, normal mucosa and turbinates. No lesions, masses, purulence or polyps.     OC/OP:   Mucous membranes moist, no masses, lesions or exudates.   Normal tongue, floor of mouth, teeth, gums, lips. Normal posterior pharyngeal wall.    Normal tonsils without erythema, exudate or obvious calculi     NECK:   No neck masses or thyroid enlargement. Trachea midline. No tenderness to palpation    LYMPHATIC:   No cervical lymphadenopathy.     RESPIRATORY:   Symmetric chest elevation & no retractions. No significant hoarseness. No increased work of breathing.    CV:   No clubbing or cyanosis. No obvious edema    Skin:   No facial rashes, vesicles or lesions.     Extremities:   No gross abnormalities      Clinic Procedure    Binocular microscopy exam  Indication: tympanic membrane(s) could not be visualized adequately with handheld otoscopy.   Location:  bilateral ears  Visualization Instrument: A microscope was used to visualize through a speculum placed in the ear canal(s) to visualize the ear canal, tympanic membranes and to assist in assessment and removal of debris.  Findings:  see physical exam documentation  Patient Status: The patient tolerated the procedure well.   Complications: There were no complications.     Information review:  External sources (notes, imaging, lab results) listed below personally reviewed to aid in medical decision making process.  -  -  -

## 2024-12-29 DIAGNOSIS — E87.1 HYPONATREMIA: Primary | ICD-10-CM

## 2025-01-17 ENCOUNTER — TELEPHONE (OUTPATIENT)
Dept: PRIMARY CARE | Facility: CLINIC | Age: 70
End: 2025-01-17
Payer: COMMERCIAL

## 2025-01-27 DIAGNOSIS — E78.5 HYPERLIPIDEMIA, UNSPECIFIED HYPERLIPIDEMIA TYPE: ICD-10-CM

## 2025-01-27 DIAGNOSIS — K21.9 GASTROESOPHAGEAL REFLUX DISEASE, UNSPECIFIED WHETHER ESOPHAGITIS PRESENT: ICD-10-CM

## 2025-01-27 RX ORDER — ATORVASTATIN CALCIUM 40 MG/1
TABLET, FILM COATED ORAL
Qty: 90 TABLET | Refills: 3 | Status: SHIPPED | OUTPATIENT
Start: 2025-01-27

## 2025-01-27 RX ORDER — FAMOTIDINE 40 MG/1
40 TABLET, FILM COATED ORAL 2 TIMES DAILY
Qty: 180 TABLET | Refills: 3 | Status: SHIPPED | OUTPATIENT
Start: 2025-01-27

## 2025-02-06 ENCOUNTER — CLINICAL SUPPORT (OUTPATIENT)
Dept: AUDIOLOGY | Facility: CLINIC | Age: 70
End: 2025-02-06
Payer: COMMERCIAL

## 2025-02-06 DIAGNOSIS — H90.A32 MIXED CONDUCTIVE AND SENSORINEURAL HEARING LOSS OF LEFT EAR WITH RESTRICTED HEARING OF RIGHT EAR: Primary | ICD-10-CM

## 2025-02-06 DIAGNOSIS — H90.A21 SENSORINEURAL HEARING LOSS (SNHL) OF RIGHT EAR WITH RESTRICTED HEARING OF LEFT EAR: ICD-10-CM

## 2025-02-06 DIAGNOSIS — H93.12 SUBJECTIVE TINNITUS OF LEFT EAR: ICD-10-CM

## 2025-02-06 PROCEDURE — 92557 COMPREHENSIVE HEARING TEST: CPT

## 2025-02-06 ASSESSMENT — PAIN SCALES - GENERAL: PAINLEVEL_OUTOF10: 0 - NO PAIN

## 2025-02-06 ASSESSMENT — PAIN - FUNCTIONAL ASSESSMENT: PAIN_FUNCTIONAL_ASSESSMENT: 0-10

## 2025-02-06 NOTE — Clinical Note
Thank you for referring this patient for an audiologic evaluation. They were seen today for a hearing test. Please see attached encounter note and scanned audiogram for impressions and recommendations.    Note asymmetry between ears (left ear worse) and unilateral tinnitus in the left ear.

## 2025-02-06 NOTE — PROGRESS NOTES
AUDIOLOGY ADULT AUDIOMETRIC EVALUATION     Name: Shawn Alfonso   : 1955 Age: 69 y.o.   Date of Evaluation: 2025 Time: 1142-6246     IMPRESSIONS   Hearing sensitivity within normal limits sloping to moderately-severe sensorineural hearing loss in the right ear, and essentially mild to moderately-severe sensorineural hearing loss in the left ear.   Note asymmetry between ears, left ear worse.   Word recognition in quiet is excellent in the right ear, and good in the left ear. Word recognition in noise is consistent with no SNR loss in the right ear, and a severe SNR loss in the left ear, and a mild SNR loss with both ears together.   Amplification needs: Need for amplification will be reassessed following medical intervention.    Today's test results are hearing loss requiring medical/otologic and audiologic follow-up.     RECOMMENDATIONS   Continue medical follow up with primary care provider and/or Ears Nose and Throat (ENT) provider as recommended.  Return for audiologic evaluation in conjunction with medical management or annually (whichever is sooner) to monitor hearing sensitivity and assess middle ear status or sooner should concerns arise. The audiology department can be reached at [phone] to schedule an appointment.   Avoid exposure to loud sounds by moving away from the noise, turning down the volume, or wearing proper hearing protection correctly.  Consider use of good communication strategies. These include but are not limited to the following: get Shawn's attention before speaking to him, close the distance between Shawn and who is speaking, limit background noise, allow Shawn access to visual cues (i.e. facial expressions/mouth movements, pictures, written instructions, etc.). When in situations where background noise cannot be avoided, position yourself so that the background noise is to your back, and you communication partner is seated in front of you, ideally with a quiet area  or wall behind them.   Consider use of tinnitus management options, which include but are not limited to the following: sound therapy (use of pleasant or calming sounds that diminish the presence of tinnitus); mindfulness, meditation, and breathing exercises; sleep hygiene; mental health evaluation and formal therapies; psychiatric management of psychopharmaceuticals; dental/orthodontia evaluation; dietary changes (reduction of sodium, caffeine, alcohol); lifestyle changes (exercise, etc.); use of hearing protection and avoidance of loud noise; and formal tinnitus therapies (Tinnitus Retraining Therapy/ TRT, Progressive Tinnitus Management, Tinnitus Activities Treatment, Cognitive Behavioral Therapy).    HISTORY    History obtained from patient report and chart review; please see medical records for complete history. Mr. Alfonso was seen today for an initial audiologic evaluation at the request of Brenton Calderon MD.  Reason for visit: Gradual decrease in hearing in the left ear greater than the right ear and Tinnitus in the left ear   Change in Hearing: yes in both ears  Difficult listening environments: People talking softly  Tinnitus: yes in the left ear; constant, non-bothersome most of the time, non-pulsatile, and described as ringing sensation. Reports that it can interfere some with sleep. Onset about two months ago.   Otalgia: denied  Aural Pressure/Fullness: denied  Recent Ear Infections/Otorrhea: denied  Dizziness: yes, described as losing balance, lasts for less than a minute, and occurs with standing but not all of the time.   History of Ear Surgeries: denied  History of Noise Exposure: yes, occupational noise exposure  Hearing Aid Use: None  Family History of Hearing Loss: denied  Falls within the last year: denied; fell over a year ago and dislocated his shoulder  Other Significant History: denied    Recall from previous encounter with Brenton Calderon MD on 12/18/2024: Mild intermittent L sided tinnitus  "with small hair resting on TM, but otherwise benign exam. Significant noise exposure history and subjective hearing loss. Will acquire audiogram to aid in eval. Consider MRI IAC if asymmetric SNHL.  Shawn Alfonso is a 69 y.o. male presenting for L>R tinnitus.  Notes 1mo history of mild intermittent L>R low tonal tinnitus.  Notes baseline hearing loss, no recent audiogram.   The patient reports slow progression of the hearing loss over time.  The paitent reports a history of intermittent, waxing/waning, nonpulsatile, tonal tinnitus which does not interfere with hearing.   The patient reports a history of significant noise exposure due to occupational exposures, industrial noise, etc.     The patient denies sudden changes in hearing.  The patient denies otalgia, otorrhea, vertigo, or facial weakness.    The patient denies a history of otologic surgery or trauma.  The patient denies a history of blast injury, TBI or concussion associated with hearing loss.  The patient denies a family history of significant hearing loss.  The patient reports a history of AOM as a child, but no recent significant history of ear infection.  No reported exposure to known ototoxins (chemotherapeutics, aminoglycosides, loop diurectics, high dose NSAIDs).   No reported history of radiation treatment to the head/neck.    EVALUATION AND PATIENT EDUCATION   The following is a brief interpretation of the obtained findings from the audiologic evaluation. Discussed results and recommendations with Mr. Alfonso. Questions were addressed and the patient was encouraged to contact our department at [phone] should concerns arise.     TEST RESULTS - See scanned audiogram in \"Media.\"   Otoscopic Evaluation:   Right Ear: Ear canal clear, tympanic membrane visualized.   Left Ear: Ear canal clear, tympanic membrane visualized.       Tympanometry (226 Hz): An objective evaluation of middle ear function. CPT code: 18412   Right Ear: Could not test " as hermetic seal could not be maintained. Possibly impacted by hair in ear canal.    Left Ear: Could not test as hermetic seal could not be maintained. Possibly impacted by hair in ear canal.        Acoustic Reflexes: An objective measure of auditory and facial nerve pathways.   (Probe) Right Ear (ipsi right stimulus ear; contralateral left stimulus ear):   Could not test as hermetic seal could not be maintained.   (Probe) Left Ear (ipsi left stimulus ear; contralateral right stimulus ear):   Could not test as hermetic seal could not be maintained.       Distortion Product Otoacoustic Emissions (DPOAE): An objective measurement of responses generated by the cochlea when simultaneously stimulated by two pure tone frequencies. CPT code: 89895   Right Ear: Did not test.   Left Ear: Did not test.   Present OAEs suggest normal or near cochlear outer hair cell function for corresponding frequency region(s). Absent OAEs with normal middle ear function can be consistent with some degree of hearing loss. Assessment of cochlear outer hair cell function may be impacted by outer or middle ear function.       Test technique: Conventional Audiometry via insert earphones and and checked with headphones.   An evaluation of hearing sensitivity via air and bone conduction and speech recognition. CPT code: 43756   Reliability: good       Pure Tone Audiometry:    Right Ear: Hearing sensitivity within normal limits for 125-2000 Hz, sloping to moderate sensorineural hearing loss for 7328-1087 Hz. Air-bone gap of 25 dB at 250 Hz.    Left Ear: Hearing sensitivity within normal limits for 125-250 Hz, sloping to mild to moderately-severe mixed hearing loss through 8000 Hz. Air-bone gaps of 35 and 25 dB at 250 and 500 Hz.   Asymmetry between ears, left ear worse.        Speech Audiometry:    Right Ear: Speech Reception Threshold (SRT) was obtained at 20 dB HL. This is in good agreement with three frequency Pure Tone Average (PTA).  Word  Recognition scores in quiet were excellent (100%) when words were presented at 70 dB HL. These results are based on [facility] Auditory Test No.6 (NU-6) (N=25).   Left Ear: Speech Reception Threshold (SRT) was obtained at 30 dB HL. This is in good agreement with two frequency Pure Tone Average (PTA).  Word Recognition scores in quiet were fair (72%) when words were presented at 70 dB HL, and good (80%) when words were presented at 85 dB HL. These results are based on [facility] Auditory Test No.6 (NU-6) (N=25).   Speech in Noise (SIN):    Quick-SIN was presented at 70 dB HL binaurally and in the right ear, and at 85 dB HL in the left ear, and indicated the following:   Right Ear: 2.5,  normal/near normal (0-3 dB)   Left Ear: 15.5,  severe SNR loss (>15 dB)   Binaural: 4.5,  mild SNR loss (3-7 dB)   SNR Loss Degree of SNR Loss Expected Improvement with Directional Microphone   0-3 dB Normal/Near normal May hear better than those with normal hearing are able to in noise.   3-7 dB Mild SNR loss  May hear almost as well as those with normal hearing are able to hear in noise.   7-15 dB Moderate SNR loss Directional microphones help; consider array microphone.   >15 dB Severe SNR Maximum SNR improvement is needed; consider an FM system.          Comparison to previous results: No previous results available.          Michelle George, AUD, CCC-A  Licensed Clinical Audiologist    Degree of Hearing Decibel Range  Key   Within Normal Limits 0-20  CNT Could Not Test   Slight 21-25  DNT Did Not Test   Mild 26-40  ECV Ear Canal Volume   Moderate 41-55  OAE Otoacoustic Emissions   Moderately-Severe 56-70  SIN Speech in Noise   Severe 71-90  TM Tympanic Membrane   Profound 91+  HA Hearing Aid   Sensorineural Hearing Loss SNHL  MLV Monitored Live Voice   Conductive Hearing Loss CHL  WNL Within Normal Limits   Noise-Induced Hearing Loss NIHL

## 2025-02-07 DIAGNOSIS — H91.8X3 ASYMMETRICAL HEARING LOSS: ICD-10-CM

## 2025-03-03 ENCOUNTER — APPOINTMENT (OUTPATIENT)
Dept: OTOLARYNGOLOGY | Facility: CLINIC | Age: 70
End: 2025-03-03
Payer: COMMERCIAL

## 2025-03-03 VITALS — WEIGHT: 185 LBS | BODY MASS INDEX: 28.98 KG/M2

## 2025-03-03 DIAGNOSIS — H91.8X3 ASYMMETRICAL HEARING LOSS: ICD-10-CM

## 2025-03-03 PROCEDURE — G8433 SCR FOR DEP NOT CPT DOC RSN: HCPCS | Performed by: GENERAL PRACTICE

## 2025-03-03 PROCEDURE — 1159F MED LIST DOCD IN RCRD: CPT | Performed by: GENERAL PRACTICE

## 2025-03-03 PROCEDURE — 4004F PT TOBACCO SCREEN RCVD TLK: CPT | Performed by: GENERAL PRACTICE

## 2025-03-03 PROCEDURE — 99213 OFFICE O/P EST LOW 20 MIN: CPT | Performed by: GENERAL PRACTICE

## 2025-03-03 NOTE — PROGRESS NOTES
Otolaryngology - Head and Neck Surgery Outpatient New Patient Visit Note        Assessment/Plan:   Problem List Items Addressed This Visit    None  Visit Diagnoses         Codes    Asymmetrical hearing loss     H91.8X3            69yoM with bothersome tinnitus and L>R high frequency SNHL.  Will acquire MRI IAC for asymmetric  Medically cleared for hearing aids pending MRI.       Follow up:  -plan for follow up in clinic as needed    All of the above findings, impressions, treatment planning and follow up plans were discussed with the patient who indicated understanding.  the patient was instructed to contact or return to clinic sooner if symptoms/signs persist or worsen despite the above management.      Brenton Calderon MD  Otolaryngology - Head and Neck Surgery            History Of Present Illness  Eleno Alfonso is a 69 y.o. male presenting for follow up of audiogram for L>R hearing loss and tinnitus.   No change in symptoms.   Audio shows L>R high frequency SNHL.      Recall    Shawn Alfonso is a 69 y.o. male presenting for L>R tinnitus.  Notes 1mo history of mild intermittent L>R low tonal tinnitus.     Notes baseline hearing loss, no recent audiogram.   The patient reports slow progression of the hearing loss over time.  The paitent reports a history of intermittent, waxing/waning, nonpulsatile, tonal tinnitus which does not interfere with hearing.   The patient reports a history of significant noise exposure due to occupational exposures, industrial noise, etc.     The patient denies sudden changes in hearing.  The patient denies otalgia, otorrhea, vertigo, or facial weakness.    The patient denies a history of otologic surgery or trauma.  The patient denies a history of blast injury, TBI or concussion associated with hearing loss.  The patient denies a family history of significant hearing loss.  The patient reports a history of AOM as a child, but no recent significant history of ear infection.  No reported  exposure to known ototoxins (chemotherapeutics, aminoglycosides, loop diurectics, high dose NSAIDs).   No reported history of radiation treatment to the head/neck.                Past Medical History  He has a past medical history of Anxiety, BPH (benign prostatic hyperplasia), Depression, GERD (gastroesophageal reflux disease), Glaucoma, Hyperlipidemia, and Hypertension.    Surgical History  He has a past surgical history that includes Knee surgery (Bilateral, 05/28/2013); Cataract extraction (Bilateral, 05/28/2013); Other surgical history (Left, 05/28/2013); and Umbilical hernia repair (02/20/2014).     Social History  He reports that he has been smoking cigars. He has never used smokeless tobacco. He reports current alcohol use of about 2.0 standard drinks of alcohol per week. He reports that he does not use drugs.    Family History  Family History   Problem Relation Name Age of Onset    Other (Heart valve replacement) Mother      Cancer Father      Prostate cancer Father          Allergies  Patient has no known allergies.    Review of Systems  ROS: Pertinent positives as noted in HPI.    - CONSTITUTIONAL: Does not report weight loss, fever or chills.    - HEENT:   Ear: Does not report  , vertigo,    , otalgia, otorrhea  Nose: Does not report congestion, rhinorrhea, epistaxis, decreased smell  Throat: Does not report pain, dysphagia, odynophagia  Larynx: Does not report hoarseness,  difficulty breathing, pain with speaking (odynophonia)  Neck: Does not report new masses, pain, swelling  Face: Does not report sinus pain, pressure, swelling, numbness, weakness     - RESPIRATORY: Does not report SOB or cough.    - CV: Does not report palpitations or chest pain.     - GI: Does not report abdominal pain, nausea, vomiting or diarrhea.    - : Does not report dysuria or urinary frequency.    - MSK: Does not report myalgia or joint pain.    - SKIN: Does not report rash or pruritus.    - NEUROLOGICAL: Does not report  headache or syncope.    - PSYCHIATRIC: Does not report recent changes in mood. Does not report anxiety or depression.         Physical Exam:     GENERAL:   Alert & Oriented to person, place and time; Normal affect and appearance. Well developed and well nourished. Conversant & cooperative with examination.     HEAD:   Normocephalic, atraumatic. No sinus tenderness to palpation. Normal parotid bilaterally. Normal facial strength.     NEUROLOGIC:   Cranial nerves II-XII grossly intact, gait WNL. Normal mood and affect.    EYES:   Extraocular movements intact. Pupils equal, round, reactive to light and accommodation. No nystagmus, no ptosis. no scleral injection.    EAR:   Normal auricle. No discomfort or TTP with manipulation.   Handheld otoscopic exam showed normal external auditory canals bilaterally. No purulence or EAC inflammation. Minimal cerumen.   Right tympanic membrane clear and mobile without evidence of perforation, retraction or middle ear effusion.   Left tympanic membrane clear and mobile without evidence of perforation, retraction or middle ear effusion.     NOSE:   No external deformity. No external nasal lesions, lacerations, or scars. Nasal tip symmetrical with normal nasal valves.   Nasal cavity with essentially midline septum, normal mucosa and turbinates. No lesions, masses, purulence or polyps.     OC/OP:   Mucous membranes moist, no masses, lesions or exudates.   Normal tongue, floor of mouth, teeth, gums, lips. Normal posterior pharyngeal wall.    Normal tonsils without erythema, exudate or obvious calculi     NECK:   No neck masses or thyroid enlargement. Trachea midline. No tenderness to palpation    LYMPHATIC:   No cervical lymphadenopathy.     RESPIRATORY:   Symmetric chest elevation & no retractions. No significant hoarseness. No increased work of breathing.    CV:   No clubbing or cyanosis. No obvious edema    Skin:   No facial rashes, vesicles or lesions.     Extremities:   No gross  abnormalities      Clinic Procedure        Information review:  External sources (notes, imaging, lab results) listed below personally reviewed to aid in medical decision making process.  -  -  -

## 2025-03-07 ENCOUNTER — APPOINTMENT (OUTPATIENT)
Dept: UROLOGY | Facility: CLINIC | Age: 70
End: 2025-03-07
Payer: COMMERCIAL

## 2025-03-07 VITALS — BODY MASS INDEX: 28.98 KG/M2 | HEIGHT: 67 IN | TEMPERATURE: 96 F

## 2025-03-07 DIAGNOSIS — N40.1 BPH WITH OBSTRUCTION/LOWER URINARY TRACT SYMPTOMS: Primary | ICD-10-CM

## 2025-03-07 DIAGNOSIS — N13.8 BPH WITH OBSTRUCTION/LOWER URINARY TRACT SYMPTOMS: Primary | ICD-10-CM

## 2025-03-07 PROCEDURE — 1159F MED LIST DOCD IN RCRD: CPT | Performed by: UROLOGY

## 2025-03-07 PROCEDURE — 4004F PT TOBACCO SCREEN RCVD TLK: CPT | Performed by: UROLOGY

## 2025-03-07 PROCEDURE — 99213 OFFICE O/P EST LOW 20 MIN: CPT | Performed by: UROLOGY

## 2025-03-07 PROCEDURE — 51798 US URINE CAPACITY MEASURE: CPT | Performed by: UROLOGY

## 2025-03-07 PROCEDURE — 1126F AMNT PAIN NOTED NONE PRSNT: CPT | Performed by: UROLOGY

## 2025-03-07 PROCEDURE — 51741 ELECTRO-UROFLOWMETRY FIRST: CPT | Performed by: UROLOGY

## 2025-03-07 ASSESSMENT — PAIN SCALES - GENERAL: PAINLEVEL_OUTOF10: 0-NO PAIN

## 2025-03-07 NOTE — PROGRESS NOTES
Scribed for Dr. Ludivina Martinez by Alirio Nettles. I, Dr. Ludivina Martinez, have personally reviewed and agreed with the information entered by the Virtual Scribe. 03/07/25    History of Present Illness (HPI):  TODAY: (03/07/25)  Eleno Alfonso is a 69 y.o. male with history of BPH s/p HoLEP on 11/30/2023. Pathology showed 31.2g benign tissue removed. Presents for annual follow up and flow/pvr. Reports he has been doing well overall. Remains very satisfied with results of his HoLEP. Stream has remained strong, and he feels he empties well. Has some persistent nocturia several times a night, but not very bothersome. IPSS 4, 1.     Uroflow results demonstrated:  Peak flow rate of 18.7 mL/s  Voided volume of 151 mL  Post-void residual was 8 mL.     Past Medical History:   Diagnosis Date    Anxiety     BPH (benign prostatic hyperplasia)     Depression     GERD (gastroesophageal reflux disease)     Glaucoma     Hyperlipidemia     Hypertension      Past Surgical History:   Procedure Laterality Date    CATARACT EXTRACTION Bilateral 05/28/2013    Cataract Surgery    KNEE SURGERY Bilateral 05/28/2013    B/L knee scopes    OTHER SURGICAL HISTORY Left 05/28/2013    Eye Surgery Results Retina Reattached    UMBILICAL HERNIA REPAIR  02/20/2014    Umbilical Hernia Repair     Family History   Problem Relation Name Age of Onset    Other (Heart valve replacement) Mother      Cancer Father      Prostate cancer Father       Social History     Tobacco Use   Smoking Status Every Day    Types: Cigars   Smokeless Tobacco Never   Tobacco Comments    1 cigar per day x 10 yrs     Current Outpatient Medications   Medication Sig Dispense Refill    acetaminophen (Tylenol) 500 mg tablet Take 2 tablets (1,000 mg) by mouth every 6 hours if needed for mild pain (1 - 3).      atorvastatin (Lipitor) 40 mg tablet TAKE 1 TABLET DAILY AS DIRECTED (NEED FOLLOW UP APPOINTMENT) 90 tablet 3    brimonidine (AlphaGAN P) 0.2 % ophthalmic solution Administer 1 drop into  both eyes every 12 hours.      dorzolamide-timoloL (Cosopt) 22.3-6.8 mg/mL ophthalmic solution Administer 1 drop into both eyes 2 times a day.      famotidine (Pepcid) 40 mg tablet TAKE 1 TABLET TWICE A  tablet 3    fexofenadine (Allegra) 180 mg tablet Take 1 tablet (180 mg) by mouth once daily.      latanoprost (Xalatan) 0.005 % ophthalmic solution Administer 1 drop into both eyes once daily at bedtime.      sertraline (Zoloft) 100 mg tablet TAKE 2 TABLETS DAILY (WILL NEED AN APPOINTMENT FOR MORE REFILLS) 180 tablet 3    valsartan (Diovan) 320 mg tablet Take 1 tablet (320 mg) by mouth once daily. 30 tablet 11     No current facility-administered medications for this visit.     No Known Allergies  Past medical, surgical, family and social history in the chart was reviewed and is accurate including any additions to what is in this HPI.    Review of systems (ROS):   Pertinent information as listed in the HPI.        Objective   There were no vitals taken for this visit.  Physical Exam:  Constitutional: NAD  HEENT: AT/NC  Resp: Non labored respirations.  Skin: No jaundice or visible skin lesions.  Neuro: No focal deficits.  Psych: Appropriate mood and affect.    Lab Review:  Lab Results   Component Value Date    WBC 4.9 05/20/2024    RBC 5.36 05/20/2024    HGB 16.4 05/20/2024    HCT 48.7 05/20/2024     05/20/2024      Lab Results   Component Value Date    BUN 15 12/04/2024    CREATININE 1.00 12/04/2024      Lab Results   Component Value Date    PSA 0.27 05/20/2024    HGBA1C 5.4 05/20/2024     Lab Results   Component Value Date    CHOL 160 05/20/2024    TRIG 61 05/20/2024    HDL 70.4 05/20/2024    ALT 22 05/20/2024    AST 20 10/22/2018     (L) 12/04/2024    K 5.0 12/04/2024    CL 98 12/04/2024    CO2 23 12/04/2024    INR 0.9 11/21/2023        ASSESSMENT:  Problem List Items Addressed This Visit    None     PLAN:  #BPH s/p HoLEP on 11/30/2023.   Remains satisfied with results of HoLEP.   Denotes  complete resolution of his obstructive urinary symptoms.   Elects to repeat a PSA, call if abnormal.   Otherwise, he may follow up with me PRN.     All questions were answered to the patient’s satisfaction.  Patient agrees with the plan and wishes to proceed.  Continue follow-up for ongoing care of his chronic medical conditions.    Scribed for Dr. Ludivina Martinez by Alirio Nettles.  I, Dr. Ludivina Martinez, have personally reviewed and agreed with the information entered by the Virtual Scribe. 03/07/25

## 2025-06-09 LAB — PSA SERPL-MCNC: 0.41 NG/ML

## 2025-07-01 ENCOUNTER — APPOINTMENT (OUTPATIENT)
Dept: PRIMARY CARE | Facility: CLINIC | Age: 70
End: 2025-07-01
Payer: COMMERCIAL

## 2025-07-01 VITALS
DIASTOLIC BLOOD PRESSURE: 88 MMHG | HEIGHT: 67 IN | WEIGHT: 203.4 LBS | BODY MASS INDEX: 31.92 KG/M2 | SYSTOLIC BLOOD PRESSURE: 158 MMHG

## 2025-07-01 DIAGNOSIS — G47.33 OSA (OBSTRUCTIVE SLEEP APNEA): ICD-10-CM

## 2025-07-01 DIAGNOSIS — N13.8 BPH WITH OBSTRUCTION/LOWER URINARY TRACT SYMPTOMS: ICD-10-CM

## 2025-07-01 DIAGNOSIS — E78.5 HYPERLIPIDEMIA, UNSPECIFIED HYPERLIPIDEMIA TYPE: Primary | ICD-10-CM

## 2025-07-01 DIAGNOSIS — I10 BENIGN HYPERTENSION: ICD-10-CM

## 2025-07-01 DIAGNOSIS — F41.9 ANXIETY DISORDER, UNSPECIFIED TYPE: ICD-10-CM

## 2025-07-01 DIAGNOSIS — D75.1 POLYCYTHEMIA: ICD-10-CM

## 2025-07-01 DIAGNOSIS — N40.1 BPH WITH OBSTRUCTION/LOWER URINARY TRACT SYMPTOMS: ICD-10-CM

## 2025-07-01 DIAGNOSIS — E87.1 HYPONATREMIA: ICD-10-CM

## 2025-07-01 PROCEDURE — 1123F ACP DISCUSS/DSCN MKR DOCD: CPT | Performed by: INTERNAL MEDICINE

## 2025-07-01 PROCEDURE — 99214 OFFICE O/P EST MOD 30 MIN: CPT | Performed by: INTERNAL MEDICINE

## 2025-07-01 PROCEDURE — 1124F ACP DISCUSS-NO DSCNMKR DOCD: CPT | Performed by: INTERNAL MEDICINE

## 2025-07-01 PROCEDURE — 3077F SYST BP >= 140 MM HG: CPT | Performed by: INTERNAL MEDICINE

## 2025-07-01 PROCEDURE — 3008F BODY MASS INDEX DOCD: CPT | Performed by: INTERNAL MEDICINE

## 2025-07-01 PROCEDURE — 4004F PT TOBACCO SCREEN RCVD TLK: CPT | Performed by: INTERNAL MEDICINE

## 2025-07-01 PROCEDURE — 3079F DIAST BP 80-89 MM HG: CPT | Performed by: INTERNAL MEDICINE

## 2025-07-01 PROCEDURE — 1159F MED LIST DOCD IN RCRD: CPT | Performed by: INTERNAL MEDICINE

## 2025-07-01 PROCEDURE — 1160F RVW MEDS BY RX/DR IN RCRD: CPT | Performed by: INTERNAL MEDICINE

## 2025-07-01 RX ORDER — AMLODIPINE BESYLATE 2.5 MG/1
2.5 TABLET ORAL DAILY
Qty: 30 TABLET | Refills: 5 | Status: SHIPPED | OUTPATIENT
Start: 2025-07-01 | End: 2025-12-28

## 2025-07-01 RX ORDER — VALSARTAN 320 MG/1
320 TABLET ORAL DAILY
Qty: 90 TABLET | Refills: 3 | Status: SHIPPED | OUTPATIENT
Start: 2025-07-01 | End: 2026-07-01

## 2025-07-01 ASSESSMENT — PATIENT HEALTH QUESTIONNAIRE - PHQ9
2. FEELING DOWN, DEPRESSED OR HOPELESS: NOT AT ALL
SUM OF ALL RESPONSES TO PHQ9 QUESTIONS 1 AND 2: 0
1. LITTLE INTEREST OR PLEASURE IN DOING THINGS: NOT AT ALL

## 2025-07-01 ASSESSMENT — ENCOUNTER SYMPTOMS
HEADACHES: 0
NECK PAIN: 0
PALPITATIONS: 0
SHORTNESS OF BREATH: 0

## 2025-07-01 NOTE — PATIENT INSTRUCTIONS
Consider having the CAT scan of your chest as we discussed, orders are entered if you decide to move ahead with this.  Please have a repeat blood test in 3 months.  Add amlodipine and follow your blood pressures at home.  Work on increasing exercise and keeping salt limited in your diet.

## 2025-07-01 NOTE — PROGRESS NOTES
"Subjective   Patient ID: Eleno Alfonso is a 70 y.o. male who presents for f/u       Note dated 12/17/24 reviewed  1-2 drinks (beer) /day-stable for quite some time.  Continues to use cigars approximately 1 or 2 every few weeks.       #1 BPH-doing well status post procedure.  #2 hyperlipidemia-remains on treatment without difficulty.  #3 anxiety disorder -doing quite well on reduced dose sertraline.   #4 nicotine dependence   #5 colon polyps-no change in bowels.  #6 htn-blood pressures at home 140s systolic.  No lightheadedness.  #7 polycythemia    Review of Systems   Respiratory:  Negative for shortness of breath.    Cardiovascular:  Negative for chest pain and palpitations.   Musculoskeletal:  Negative for neck pain.   Neurological:  Negative for headaches.   All other systems reviewed and are negative.      Objective   Ht 1.702 m (5' 7\")   Wt 92.3 kg (203 lb 6.4 oz)   BMI 31.86 kg/m²     Physical Exam  Constitutional:       General: He is not in acute distress.     Appearance: Normal appearance. He is not ill-appearing or toxic-appearing.   Cardiovascular:      Rate and Rhythm: Normal rate and regular rhythm.      Heart sounds: No murmur heard.  Pulmonary:      Effort: Pulmonary effort is normal.      Breath sounds: Normal breath sounds.   Abdominal:      General: Abdomen is flat.      Palpations: Abdomen is soft. There is no mass.      Tenderness: There is no abdominal tenderness.   Neurological:      Mental Status: He is alert and oriented to person, place, and time.   Psychiatric:         Mood and Affect: Mood normal.         Thought Content: Thought content normal.         Judgment: Judgment normal.     Lab Results   Component Value Date    WBC 4.9 05/20/2024    HGB 16.4 05/20/2024    HCT 48.7 05/20/2024     05/20/2024    CHOL 160 05/20/2024    TRIG 61 05/20/2024    HDL 70.4 05/20/2024    ALT 22 05/20/2024    AST 20 10/22/2018     (L) 06/09/2025    K 4.6 06/09/2025    CL 99 06/09/2025    " CREATININE 0.91 06/09/2025    BUN 11 06/09/2025    CO2 22 06/09/2025    TSH 3.10 06/09/2025    PSA 0.41 06/09/2025    INR 0.9 11/21/2023    HGBA1C 5.4 05/20/2024     === 04/02/25 ===    MR IAC WO AND W CONTRAST    - Impression -  1.  Within the limitation of motion artifact, unremarkable MRI of  bilateral internal auditory canals.  2.  Mild degree of nonspecific white matter signal compatible with  microangiopathy.    MACRO:  None    Signed by: Zoila Mustafa 4/2/2025 2:25 PM  Dictation workstation:   [id number]    Assessment/Plan       #1 BPH-stable.  Follow-up urology as needed.  Follow PSA annually labs.  #2 hyperlipidemia-    #3 anxiety disorder -Stable on reduced dose.  He will consider reducing to 50 mg sertraline.  Reviewed pros and cons   #4 nicotine dependence- reviewed importance of complete smoking cessation.  Total pack years approximately 10.  Did discuss option of low-dose CT screening, he does not clearly qualify and declines regardless.  #5 colon polyps- follow up scope 2027, reviewed  #6 htn-too high.  Continue valsartan.  Add amlodipine 2.5 mg.  Follow-up 3 months.  Reviewed DASH diet coupled with daily exercise.  #7 polycythemia-  resolved last test. retest  #8 snoring, witnessed apneas, increased blood pressure. Sleep study ordered--> he continues to decline sleep study.  Reviewed risk of untreated apnea.    #9 hyponatremia-consistent with SIADH.  Recently had brain damage.  Reviewed differential diagnosis and possible underlying etiologies.  Doubt related to medicines as have been reducing dose of sertraline.  Recommend CT scan chest to rule out any underlying pathology.  Reviewed differential diagnosis including low-grade infections, COPD, lung cancer.  He voiced clear understanding but declines CT scan at this point.  I did enter order in case he changes his mind and provided information on how to schedule.  Again reviewed differential of SIADH including but not limited to underlying lung  cancer and need for CT scan to rule this out.  Does agree to repeat BMP in 3 months.       Patient will consider tetanus booster, declines again now.   Rec flu vaccine/ COVID shanell- reviewed  Declines shingles vaccine.    prevnar 20--> rec. Reviewed again w/ pt. He declines    Follow-up 3 months       Answers submitted by the patient for this visit:  High Blood Pressure Questionnaire (Submitted on 6/16/2024)  Chief Complaint: Hypertension  Chronicity: recurrent  Onset: more than 1 year ago  Progression since onset: resolved  Condition status: controlled  anxiety: No  blurred vision: No  malaise/fatigue: No  orthopnea: No  peripheral edema: No  PND: No  sweats: No  Agents associated with hypertension: NSAIDs  CAD risks: dyslipidemia, obesity, smoking/tobacco exposure  Compliance problems: no compliance problems    Answers submitted by the patient for this visit:  High Blood Pressure Questionnaire (Submitted on 12/15/2024)  Chief Complaint: Hypertension  Chronicity: recurrent  Onset: more than 1 year ago  Progression since onset: unchanged  Condition status: controlled  anxiety: No  blurred vision: No  malaise/fatigue: No  orthopnea: No  peripheral edema: No  PND: No  sweats: No  Agents associated with hypertension: no associated agents  CAD risks: dyslipidemia  Compliance problems: no compliance problems

## (undated) DEVICE — GLOVE, PROTEXIS PI CLASSIC, SZ-6.5, PF, LF

## (undated) DEVICE — Device

## (undated) DEVICE — BAG, DRAINAGE, CONTINUOUS IRRIGATION, 4L

## (undated) DEVICE — UROVAC BLADDER EVACUATOR

## (undated) DEVICE — BLADE, ROTATION MORCELLATOR, 4.8MM X 335MM, PIRHANA, DISPOSABLE

## (undated) DEVICE — GOWN, SURGICAL, SIRUS, NON REINFORCED, LARGE

## (undated) DEVICE — IRRIGATION SET, CYSTOSCOPY, TURP, Y, CONTINUOUS, 81 IN

## (undated) DEVICE — SYRINGE, 20 CC, LUER LOCK

## (undated) DEVICE — TUBING, MORCELLATOR PUMP, DISPOSABLE

## (undated) DEVICE — SYRINGE, TOOMEY, IRRIGATION, 60ML, INDIVIDUAL WRAP, STERILE

## (undated) DEVICE — TUBING, ELLIK, FOR ELLIK/TOOMEY ADAPTERS

## (undated) DEVICE — GUIDEWIRE, DUAL SENSOR, .035 X 150 STRAIGHT,  3CM

## (undated) DEVICE — CATHETER, LASER URETERAL, 7.1FR, 40CM